# Patient Record
Sex: MALE | Race: WHITE | NOT HISPANIC OR LATINO | Employment: FULL TIME | URBAN - METROPOLITAN AREA
[De-identification: names, ages, dates, MRNs, and addresses within clinical notes are randomized per-mention and may not be internally consistent; named-entity substitution may affect disease eponyms.]

---

## 2017-01-12 ENCOUNTER — GENERIC CONVERSION - ENCOUNTER (OUTPATIENT)
Dept: OTHER | Facility: OTHER | Age: 52
End: 2017-01-12

## 2017-01-19 ENCOUNTER — ALLSCRIPTS OFFICE VISIT (OUTPATIENT)
Dept: RADIOLOGY | Facility: CLINIC | Age: 52
End: 2017-01-19
Payer: COMMERCIAL

## 2017-01-26 ENCOUNTER — GENERIC CONVERSION - ENCOUNTER (OUTPATIENT)
Dept: OTHER | Facility: OTHER | Age: 52
End: 2017-01-26

## 2017-02-17 ENCOUNTER — ALLSCRIPTS OFFICE VISIT (OUTPATIENT)
Dept: OTHER | Facility: OTHER | Age: 52
End: 2017-02-17

## 2018-01-09 NOTE — RESULT NOTES
Message   Recorded as Task   Date: 11/22/2016 08:05 AM, Created By: Toña Rivera   Task Name: Follow Up   Assigned To: Beena Haskins   Regarding Patient: Jennie Verduzco, Status: Active   Comment:    Baron Yepez - 22 Nov 2016 8:05 AM     TASK CREATED  pt referred for TFESI from neurosurgeon-kendall interested? Beena Haskins - 22 Nov 2016 11:02 AM     TASK REPLIED TO: Previously Assigned To Beena Haskins  what level? L4-s1 tfesi  dx foraminal stenosis , DDD? Baron Yepez - 22 Nov 2016 11:08 AM     TASK REPLIED TO: Previously Assigned To Baron Yepez  right L3/4    foraminal stenosis and radiculitis   Beena Haskins - 29 Nov 2016 2:01 PM     TASK EDITED  l/m for pt to rtc to schedule procedure   Beena Haskins - 29 Nov 2016 4:18 PM     TASK EDITED  pt scheduled for proce on 12/2016 at rt L3-4 TFESI, pt has not been seen in office since 2013   pt states he has a consult 11/30/2016, let me know if the procedure changes so I can do the proper authorization   Baron Yepez - 30 Nov 2016 8:03 AM     TASK REPLIED TO: Previously Assigned To Baron Yepez  aware   Beena Haskins - 30 Nov 2016 1:10 PM     TASK EDITED  per scheduling form changed procedure to Rt L3 TFESI

## 2018-01-11 NOTE — RESULT NOTES
Message   Recorded as Task   Date: 12/27/2016 09:46 AM, Created By: Colletta Bossier   Task Name: Follow Up   Assigned To: [facility] surgery sched,Team   Regarding Patient: Jalil Browne, Status: Active   Comment:    Roselia Espinal - 27 Dec 2016 9:46 AM     TASK CREATED  S/p Right L3 TFESI #2 on 12/19/16 w/Dr Erasmo Ray in [place]  No f/u scheduled    Please call 12/27/16   Roselia Espinal - 27 Dec 2016 10:24 AM     TASK EDITED  1st attempt-lm for f/u after injection   Roselia Espinal - 30 Dec 2016 3:57 PM     TASK EDITED  Pt lm on [place] procedure f/u line 12/30/16 @ 4125  -returning f/u call  -best #[phone]   Counts include 234 beds at the [facility] - 05 Jan 1443 8:44 PM     TASK EDITED  Pt reports he is doing better over all 70% relief - the numbness has gotten better but has not stopped  Any other recommendations or f/u PRN? Baron Yepez - 05 Jan 2017 1:52 PM     TASK REPLIED TO: Previously Assigned To Baron Yepez  can repeat x 1 and f/u with dg 2 weeks after   Counts include 234 beds at the [facility] - 05 Jan 3159 5:24 PM     TASK EDITED   Beena Haskins - 09 Jan 2017 3:42 PM     TASK EDITED  l/m for pt to rtc to schedule rpt proc and f/u post to [facility]  ******************   Beena Haskins - 12 Jan 2017 3:13 PM     TASK EDITED  procedure scheduled for 1/19/2017, reviewed pre-procedure instructions with pt     pt states he will schedule his 2 wk post f/u appt on 1/19/2017  Maura Rodriguez

## 2018-01-11 NOTE — MISCELLANEOUS
Message   Recorded as Task   Date: 12/06/2016 10:32 AM, Created By: Ravi Montana   Task Name: Care Coordination   Assigned To: [address] ,Team   Regarding Patient: Grzegorz Chakraborty, Status: Active   Comment:    Ravi Avitia - 06 Dec 2016 10:32 AM     TASK CREATED  please fax his Pain diary to his neurosurgeon   Faxed over Pain Diary to Dr Cheo Benedict office, Fax # [phone] as requested by Dr Wilma Perez  Active Problems    1  Disc degeneration, lumbar (722 52) (M51 36)   2  Herniated nucleus pulposus, L3-4 right (722 10) (M51 26)   3  Lumbar canal stenosis (724 02) (M48 06)   4  Lumbar radiculopathy (724 4) (M54 16)   5  Lumbar radiculopathy (724 4) (M54 16)   6  Lumbar Spondylosis With Radiculopathy (721 3)    Current Meds   1  Daily Value Multivitamin TABS; Therapy: (Recorded:04Kjy1890) to Recorded   2  HydroCHLOROthiazide 25 MG Oral Tablet; TAKE 1 TABLET DAILY; Therapy: 04NQW3889 to Recorded   3  Lisinopril 5 MG Oral Tablet; Therapy: (Recorded:19Axo4639) to Recorded   4  MetFORMIN HCl - 1000 MG Oral Tablet; TAKE 1 TABLET EVERY 12 HOURS; Therapy: 19DGR6299 to Recorded   5  Metoprolol Tartrate 100 MG Oral Tablet; TAKE 1 TABLET DAILY AS DIRECTED; Therapy: 19MNC9226 to Recorded   6  NovoLOG 100 UNIT/ML Subcutaneous Solution; Therapy: (Recorded:85Wwe0318) to Recorded   7  Sertraline HCl - 50 MG Oral Tablet; TAKE 1 TABLET DAILY; Therapy: 56TVE4819 to Recorded   8  Simvastatin 40 MG Oral Tablet; Therapy: (Recorded:10Uua7212) to Recorded   9  Tamsulosin HCl - 0 4 MG Oral Capsule; Therapy: 06TBC0471 to Recorded   10  Trulicity 4 16 RI/8 3DF Subcutaneous Solution Pen-injector; Therapy: 89MTI7673 to Recorded    Allergies    1  Morphine Derivatives   2   TETANUS    Signatures   Electronically signed by : Donald Morrison, ; Dec  8 2016  9:57AM EST                       (Author)

## 2018-01-12 NOTE — RESULT NOTES
Message   Recorded as Task   Date: 01/26/2017 09:17 AM, Created By: Malathi Jane   Task Name: Follow Up   Assigned To: [facility] procedure,Team   Regarding Patient: Sierra Jett, Status: Active   Comment:    Roselia Espinal - 26 Jan 2017 9:17 AM     TASK CREATED  S/p right L3 TFESI on 1/19/17 w/SL on 1/19/17  F/u scheduled for 2/7/17 w/DG    Please call 9/24/26   Betsy Pagan - 26 Jan 5997 1:57 PM     TASK EDITED  Pt reports over all 65% relief       He will give me time and f/u with DG on the 17th   Baron Yepez - 26 Jan 2017 2:01 PM     TASK REPLIED TO: Previously Assigned To Baron Yepez  aware        Signatures   Electronically signed by : Jose Zurita, ; Jan 26 2017  2:02PM EST                       (Author)

## 2018-01-13 VITALS
SYSTOLIC BLOOD PRESSURE: 158 MMHG | WEIGHT: 270.25 LBS | BODY MASS INDEX: 37.83 KG/M2 | HEIGHT: 71 IN | DIASTOLIC BLOOD PRESSURE: 88 MMHG | HEART RATE: 76 BPM

## 2018-04-08 ENCOUNTER — HOSPITAL ENCOUNTER (EMERGENCY)
Facility: HOSPITAL | Age: 53
Discharge: HOME | End: 2018-04-09
Attending: EMERGENCY MEDICINE | Admitting: EMERGENCY MEDICINE
Payer: COMMERCIAL

## 2018-04-08 ENCOUNTER — APPOINTMENT (EMERGENCY)
Dept: RADIOLOGY | Facility: HOSPITAL | Age: 53
End: 2018-04-08
Payer: COMMERCIAL

## 2018-04-08 VITALS
TEMPERATURE: 97.3 F | HEIGHT: 70 IN | HEART RATE: 86 BPM | OXYGEN SATURATION: 96 % | BODY MASS INDEX: 38.65 KG/M2 | RESPIRATION RATE: 18 BRPM | SYSTOLIC BLOOD PRESSURE: 120 MMHG | WEIGHT: 270 LBS | DIASTOLIC BLOOD PRESSURE: 71 MMHG

## 2018-04-08 DIAGNOSIS — R11.2 NAUSEA AND VOMITING: Primary | ICD-10-CM

## 2018-04-08 DIAGNOSIS — F10.929 ALCOHOL INTOXICATION (HCC): ICD-10-CM

## 2018-04-08 DIAGNOSIS — R19.7 DIARRHEA: ICD-10-CM

## 2018-04-08 LAB
ALBUMIN SERPL BCP-MCNC: 4 G/DL (ref 3.5–5)
ALP SERPL-CCNC: 99 U/L (ref 46–116)
ALT SERPL W P-5'-P-CCNC: 52 U/L (ref 12–78)
ANION GAP SERPL CALCULATED.3IONS-SCNC: 10 MMOL/L (ref 4–13)
AST SERPL W P-5'-P-CCNC: 36 U/L (ref 5–45)
BACTERIA UR QL AUTO: ABNORMAL /HPF
BASOPHILS # BLD AUTO: 0.03 THOUSANDS/ΜL (ref 0–0.1)
BASOPHILS NFR BLD AUTO: 0 % (ref 0–1)
BILIRUB SERPL-MCNC: 0.77 MG/DL (ref 0.2–1)
BILIRUB UR QL STRIP: NEGATIVE
BUN SERPL-MCNC: 16 MG/DL (ref 5–25)
CALCIUM SERPL-MCNC: 8.9 MG/DL (ref 8.3–10.1)
CHLORIDE SERPL-SCNC: 104 MMOL/L (ref 100–108)
CLARITY UR: CLEAR
CO2 SERPL-SCNC: 27 MMOL/L (ref 21–32)
COLOR UR: YELLOW
CREAT SERPL-MCNC: 1.12 MG/DL (ref 0.6–1.3)
EOSINOPHIL # BLD AUTO: 0.13 THOUSAND/ΜL (ref 0–0.61)
EOSINOPHIL NFR BLD AUTO: 1 % (ref 0–6)
ERYTHROCYTE [DISTWIDTH] IN BLOOD BY AUTOMATED COUNT: 13.3 % (ref 11.6–15.1)
ETHANOL SERPL-MCNC: 129 MG/DL (ref 0–3)
GFR SERPL CREATININE-BSD FRML MDRD: 75 ML/MIN/1.73SQ M
GLUCOSE SERPL-MCNC: 113 MG/DL (ref 65–140)
GLUCOSE UR STRIP-MCNC: NEGATIVE MG/DL
HCT VFR BLD AUTO: 43.4 % (ref 36.5–49.3)
HGB BLD-MCNC: 14.4 G/DL (ref 12–17)
HGB UR QL STRIP.AUTO: NEGATIVE
HYALINE CASTS #/AREA URNS LPF: ABNORMAL /LPF
KETONES UR STRIP-MCNC: NEGATIVE MG/DL
LEUKOCYTE ESTERASE UR QL STRIP: NEGATIVE
LIPASE SERPL-CCNC: 268 U/L (ref 73–393)
LYMPHOCYTES # BLD AUTO: 2.75 THOUSANDS/ΜL (ref 0.6–4.47)
LYMPHOCYTES NFR BLD AUTO: 20 % (ref 14–44)
MCH RBC QN AUTO: 30.3 PG (ref 26.8–34.3)
MCHC RBC AUTO-ENTMCNC: 33.2 G/DL (ref 31.4–37.4)
MCV RBC AUTO: 91 FL (ref 82–98)
MONOCYTES # BLD AUTO: 0.7 THOUSAND/ΜL (ref 0.17–1.22)
MONOCYTES NFR BLD AUTO: 5 % (ref 4–12)
NEUTROPHILS # BLD AUTO: 10.06 THOUSANDS/ΜL (ref 1.85–7.62)
NEUTS SEG NFR BLD AUTO: 74 % (ref 43–75)
NITRITE UR QL STRIP: NEGATIVE
NON-SQ EPI CELLS URNS QL MICRO: ABNORMAL /HPF
NRBC BLD AUTO-RTO: 0 /100 WBCS
PH UR STRIP.AUTO: 5.5 [PH] (ref 4.5–8)
PLATELET # BLD AUTO: 257 THOUSANDS/UL (ref 149–390)
PMV BLD AUTO: 11.2 FL (ref 8.9–12.7)
POTASSIUM SERPL-SCNC: 3.2 MMOL/L (ref 3.5–5.3)
PROT SERPL-MCNC: 8.2 G/DL (ref 6.4–8.2)
PROT UR STRIP-MCNC: ABNORMAL MG/DL
RBC # BLD AUTO: 4.76 MILLION/UL (ref 3.88–5.62)
RBC #/AREA URNS AUTO: ABNORMAL /HPF
SODIUM SERPL-SCNC: 141 MMOL/L (ref 136–145)
SP GR UR STRIP.AUTO: 1.02 (ref 1–1.03)
UROBILINOGEN UR QL STRIP.AUTO: 0.2 E.U./DL
WBC # BLD AUTO: 13.7 THOUSAND/UL (ref 4.31–10.16)
WBC #/AREA URNS AUTO: ABNORMAL /HPF

## 2018-04-08 PROCEDURE — 71046 X-RAY EXAM CHEST 2 VIEWS: CPT

## 2018-04-08 PROCEDURE — 96361 HYDRATE IV INFUSION ADD-ON: CPT

## 2018-04-08 PROCEDURE — 81002 URINALYSIS NONAUTO W/O SCOPE: CPT | Performed by: EMERGENCY MEDICINE

## 2018-04-08 PROCEDURE — 80053 COMPREHEN METABOLIC PANEL: CPT | Performed by: EMERGENCY MEDICINE

## 2018-04-08 PROCEDURE — 81001 URINALYSIS AUTO W/SCOPE: CPT

## 2018-04-08 PROCEDURE — 83690 ASSAY OF LIPASE: CPT | Performed by: EMERGENCY MEDICINE

## 2018-04-08 PROCEDURE — 96360 HYDRATION IV INFUSION INIT: CPT

## 2018-04-08 PROCEDURE — 80320 DRUG SCREEN QUANTALCOHOLS: CPT | Performed by: EMERGENCY MEDICINE

## 2018-04-08 PROCEDURE — 36415 COLL VENOUS BLD VENIPUNCTURE: CPT | Performed by: EMERGENCY MEDICINE

## 2018-04-08 PROCEDURE — 85025 COMPLETE CBC W/AUTO DIFF WBC: CPT | Performed by: EMERGENCY MEDICINE

## 2018-04-08 PROCEDURE — 93005 ELECTROCARDIOGRAM TRACING: CPT

## 2018-04-08 RX ORDER — LISINOPRIL 40 MG/1
40 TABLET ORAL DAILY
COMMUNITY

## 2018-04-08 RX ORDER — AMLODIPINE BESYLATE 5 MG/1
5 TABLET ORAL DAILY
COMMUNITY

## 2018-04-08 RX ORDER — MULTIVITAMIN
1 TABLET ORAL DAILY
COMMUNITY

## 2018-04-08 RX ORDER — TAMSULOSIN HYDROCHLORIDE 0.4 MG/1
0.4 CAPSULE ORAL
COMMUNITY

## 2018-04-08 RX ORDER — ATORVASTATIN CALCIUM 40 MG/1
40 TABLET, FILM COATED ORAL DAILY
COMMUNITY

## 2018-04-08 RX ORDER — POTASSIUM CHLORIDE 20 MEQ/1
40 TABLET, EXTENDED RELEASE ORAL ONCE
Status: COMPLETED | OUTPATIENT
Start: 2018-04-08 | End: 2018-04-08

## 2018-04-08 RX ORDER — HYDROCHLOROTHIAZIDE 25 MG/1
25 TABLET ORAL DAILY
COMMUNITY

## 2018-04-08 RX ORDER — GABAPENTIN 600 MG/1
600 TABLET ORAL 3 TIMES DAILY
COMMUNITY

## 2018-04-08 RX ORDER — METOPROLOL SUCCINATE 50 MG/1
25 TABLET, EXTENDED RELEASE ORAL DAILY
COMMUNITY

## 2018-04-08 RX ADMIN — SODIUM CHLORIDE 1000 ML: 0.9 INJECTION, SOLUTION INTRAVENOUS at 22:09

## 2018-04-08 RX ADMIN — POTASSIUM CHLORIDE 40 MEQ: 1500 TABLET, EXTENDED RELEASE ORAL at 23:15

## 2018-04-09 LAB
ATRIAL RATE: 86 BPM
P AXIS: 49 DEGREES
PR INTERVAL: 142 MS
QRS AXIS: -11 DEGREES
QRSD INTERVAL: 94 MS
QT INTERVAL: 342 MS
QTC INTERVAL: 404 MS
T WAVE AXIS: 75 DEGREES
VENTRICULAR RATE: 84 BPM

## 2018-04-09 PROCEDURE — 99285 EMERGENCY DEPT VISIT HI MDM: CPT

## 2018-04-09 PROCEDURE — 93010 ELECTROCARDIOGRAM REPORT: CPT | Performed by: INTERNAL MEDICINE

## 2018-04-09 NOTE — ED PROVIDER NOTES
History  Chief Complaint   Patient presents with    Altered Mental Status     Patient reported by EMS to have had altered mental status at home  Patient reports he had been drinking most of a bottle of One [employer] [place] starting around 3pm today  Patient reported to have vomited at home and prior to arrival in the ambulance     45yo male pmhx HTN, DM presents for evaluation of vomiting and diarrhea  Patient drank "about 750mL of Ganga alexandra" this afternoon and is a poor historian  Patient's daughter says patient has vomited more than 10x today and has had two episodes of diarrhea  Patient denies having similar symptoms  Denies fever, chills, chest pain, SOB, abdominal pain, hematemesis, melena, hematochezia or dysuria  Patient has an insulin pump and says sugar ranged from 130-140s today  Per EMS, patient was uncooperative and fingerstick was 107  Denies recent sick contacts or travel  Patient says he is not a daily alcohol user  Denies tobacco or recreational drug use            Prior to Admission Medications   Prescriptions Last Dose Informant Patient Reported? Taking?    Multiple Vitamin (MULTIVITAMIN) tablet   Yes Yes   Sig: Take 1 tablet by mouth daily   amLODIPine (NORVASC) 5 mg tablet   Yes Yes   Sig: Take 5 mg by mouth daily   atorvastatin (LIPITOR) 40 mg tablet   Yes Yes   Sig: Take 40 mg by mouth daily   gabapentin (NEURONTIN) 600 MG tablet   Yes Yes   Sig: Take 600 mg by mouth 3 (three) times a day   hydrochlorothiazide (HYDRODIURIL) 25 mg tablet   Yes Yes   Sig: Take 25 mg by mouth daily   insulin lispro (HumaLOG) 100 units/mL   Yes Yes   Sig: by Subcutaneous Insulin Pump route as needed   lisinopril (ZESTRIL) 40 mg tablet   Yes Yes   Sig: Take 40 mg by mouth daily   metFORMIN (GLUCOPHAGE) 1000 MG tablet   Yes Yes   Sig: Take 1,000 mg by mouth 2 (two) times a day with meals   metoprolol succinate (TOPROL-XL) 50 mg 24 hr tablet   Yes Yes   Sig: Take 25 mg by mouth daily   sertraline (ZOLOFT) 50 mg tablet Yes Yes   Sig: Take 50 mg by mouth daily   tamsulosin (FLOMAX) 0 4 mg   Yes Yes   Sig: Take 0 4 mg by mouth daily with dinner      Facility-Administered Medications: None       Past Medical History:   Diagnosis Date    Diabetes mellitus ([address] )     Hypertension        History reviewed  No pertinent surgical history  History reviewed  No pertinent family history  I have reviewed and agree with the history as documented  Social History   Substance Use Topics    Smoking status: Current Some Day Smoker    Smokeless tobacco: Never Used    Alcohol use Yes        Review of Systems   Constitutional: Negative for chills, diaphoresis, fatigue and fever  HENT: Negative for congestion, rhinorrhea and sore throat  Eyes: Negative for photophobia and visual disturbance  Respiratory: Negative for cough, chest tightness and shortness of breath  Cardiovascular: Negative for chest pain and palpitations  Gastrointestinal: Positive for diarrhea, nausea and vomiting  Negative for abdominal pain, blood in stool and constipation  Genitourinary: Negative for dysuria, frequency and hematuria  Musculoskeletal: Negative for back pain, gait problem, myalgias, neck pain and neck stiffness  Skin: Negative for pallor and rash  Neurological: Negative for weakness, light-headedness, numbness and headaches  Hematological: Negative for adenopathy  Does not bruise/bleed easily  All other systems reviewed and are negative        Physical Exam  ED Triage Vitals [04/08/18 2148]   Temperature Pulse Respirations Blood Pressure SpO2   (!) 97 3 °F (36 3 °C) 88 18 149/72 99 %      Temp Source Heart Rate Source Patient Position - Orthostatic VS BP Location FiO2 (%)   Oral Monitor Sitting Right arm --      Pain Score       No Pain           Orthostatic Vital Signs  Vitals:    04/08/18 2148 04/08/18 2215   BP: 149/72 120/71   Pulse: 88 86   Patient Position - Orthostatic VS: Sitting        Physical Exam   Constitutional: He is oriented to person, place, and time  He appears well-developed and well-nourished  No distress  Patient alert and oriented, appears intoxicated, in no acute distress    HENT:   Head: Normocephalic and atraumatic  Eyes: Conjunctivae and EOM are normal  Pupils are equal, round, and reactive to light  Neck: Normal range of motion  Neck supple  Cardiovascular: Normal rate, regular rhythm, normal heart sounds and intact distal pulses  Pulmonary/Chest: Effort normal and breath sounds normal  No respiratory distress  He has no wheezes  He has no rales  Abdominal: Soft  Bowel sounds are normal  He exhibits no distension and no mass  There is no tenderness  There is no rebound and no guarding  No hernia  Musculoskeletal: Normal range of motion  He exhibits no edema  Lymphadenopathy:     He has no cervical adenopathy  Neurological: He is alert and oriented to person, place, and time  He displays normal reflexes  No cranial nerve deficit or sensory deficit  He exhibits normal muscle tone  Coordination normal    No facial asymmetry noted, CN 2-12 intact, full ROM of upper and lower extremities, muscle strength 5/5 throughout, DTRs normal, sensation intact throughout, negative finger to nose/Faye   Skin: Skin is warm and dry  Capillary refill takes less than 2 seconds  No rash noted  He is not diaphoretic  No erythema  No pallor  Psychiatric: He has a normal mood and affect  His behavior is normal  Judgment and thought content normal    Nursing note and vitals reviewed        ED Medications  Medications    EMS REPLENISHMENT MED ( Does not apply Given to EMS 4/8/18 2207)   sodium chloride 0 9 % bolus 1,000 mL (0 mL Intravenous Stopped 4/8/18 2350)   potassium chloride (K-DUR,KLOR-CON) CR tablet 40 mEq (40 mEq Oral Given 4/8/18 2315)       Diagnostic Studies  Results Reviewed     Procedure Component Value Units Date/Time    Urine Microscopic [[id number]]  (Abnormal) Collected:  04/08/18 2303    Lab Status:  Final result Specimen:  Urine from Urine, Clean Catch Updated:  04/08/18 2341     RBC, UA None Seen /hpf      WBC, UA None Seen /hpf      Epithelial Cells None Seen /hpf      Bacteria, UA None Seen /hpf      Hyaline Casts, UA 5-10 (A) /lpf     POCT urinalysis dipstick [[id number]]  (Abnormal) Resulted:  04/08/18 2305    Lab Status:  Final result Specimen:  Urine Updated:  04/08/18 2305    ED Urine Macroscopic [[id number]]  (Abnormal) Collected:  04/08/18 2303    Lab Status:  Final result Specimen:  Urine Updated:  04/08/18 2301     Color, UA Yellow     Clarity, UA Clear     pH, UA 5 5     Leukocytes, UA Negative     Nitrite, UA Negative     Protein,  (2+) (A) mg/dl      Glucose, UA Negative mg/dl      Ketones, UA Negative mg/dl      Urobilinogen, UA 0 2 E U /dl      Bilirubin, UA Negative     Blood, UA Negative     Specific Gravity, UA 1 020    Narrative:       CLINITEK RESULT    Comprehensive metabolic panel [[id number]]  (Abnormal) Collected:  04/08/18 2208    Lab Status:  Final result Specimen:  Blood from Arm, Left Updated:  04/08/18 2239     Sodium 141 mmol/L      Potassium 3 2 (L) mmol/L      Chloride 104 mmol/L      CO2 27 mmol/L      Anion Gap 10 mmol/L      BUN 16 mg/dL      Creatinine 1 12 mg/dL      Glucose 113 mg/dL      Calcium 8 9 mg/dL      AST 36 U/L      ALT 52 U/L      Alkaline Phosphatase 99 U/L      Total Protein 8 2 g/dL      Albumin 4 0 g/dL      Total Bilirubin 0 77 mg/dL      eGFR 75 ml/min/1 73sq m     Narrative:         National Kidney Disease Education Program recommendations are as follows:  GFR calculation is accurate only with a steady state creatinine  Chronic Kidney disease less than 60 ml/min/1 73 sq  meters  Kidney failure less than 15 ml/min/1 73 sq  meters      Lipase [[id number]]  (Normal) Collected:  04/08/18 2208    Lab Status:  Final result Specimen:  Blood from Arm, Left Updated:  04/08/18 2239     Lipase 268 u/L     Ethanol [[id number]]  (Abnormal) Collected:  04/08/18 2208    Lab Status:  Final result Specimen:  Blood from Arm, Left Updated:  04/08/18 2236     Ethanol Lvl 129 (H) mg/dL     CBC and differential [[id number]]  (Abnormal) Collected:  04/08/18 2208    Lab Status:  Final result Specimen:  Blood from Arm, Left Updated:  04/08/18 2226     WBC 13 70 (H) Thousand/uL      RBC 4 76 Million/uL      Hemoglobin 14 4 g/dL      Hematocrit 43 4 %      MCV 91 fL      MCH 30 3 pg      MCHC 33 2 g/dL      RDW 13 3 %      MPV 11 2 fL      Platelets 289 Thousands/uL      nRBC 0 /100 WBCs      Neutrophils Relative 74 %      Lymphocytes Relative 20 %      Monocytes Relative 5 %      Eosinophils Relative 1 %      Basophils Relative 0 %      Neutrophils Absolute 10 06 (H) Thousands/µL      Lymphocytes Absolute 2 75 Thousands/µL      Monocytes Absolute 0 70 Thousand/µL      Eosinophils Absolute 0 13 Thousand/µL      Basophils Absolute 0 03 Thousands/µL                  XR chest 2 views   ED Interpretation by Ivonne Hernandez MD (04/08 2242)   No infiltrates or effusions noted            Procedures  Procedures      Phone Consults  ED Phone Contact    ED Course  ED Course as of Apr 09 0134   Sun Apr 08, 2018   2345 Patient discharged to care of daughter and brother in law                                MDM  Number of Diagnoses or Management Options  Alcohol intoxication ([facility] [address] ):   Diarrhea:   Nausea and vomiting:   Diagnosis management comments: Impression: 47yo male presents for evaluation of vomiting and diarrhea  Ddx: gastritis, hypo or hyperglycemia, uti  Plan: cbc, cmp, lipase, etoh, IVFs, ekg, cxr    CritCare Time    Disposition  Final diagnoses:   Nausea and vomiting   Diarrhea   Alcohol intoxication ([address] )     Time reflects when diagnosis was documented in both MDM as applicable and the Disposition within this note     Time User Action Codes Description Comment    4/8/2018 11:38 PM Gisele Klinefelter Add [R11 2] Nausea and vomiting     4/8/2018 11:39 PM Gisele Klinefelter Add [R19 7] Diarrhea     4/8/2018 11:39 PM Claude Burkitt Add [K70 517] Alcohol intoxication [facility])       ED Disposition     ED Disposition Condition Comment    Discharge  Nabeel Moody discharge to home/self care  Condition at discharge: Good        Follow-up Information     Follow up With Specialties Details Why [address], DO Family Medicine Go in 3 days As needed, If symptoms worsen [address]  [address] [facility]  [address] [address]          Discharge Medication List as of 4/8/2018 11:39 PM      CONTINUE these medications which have NOT CHANGED    Details   amLODIPine (NORVASC) 5 mg tablet Take 5 mg by mouth daily, Historical Med      atorvastatin (LIPITOR) 40 mg tablet Take 40 mg by mouth daily, Historical Med      gabapentin (NEURONTIN) 600 MG tablet Take 600 mg by mouth 3 (three) times a day, Historical Med      hydrochlorothiazide (HYDRODIURIL) 25 mg tablet Take 25 mg by mouth daily, Historical Med      insulin lispro (HumaLOG) 100 units/mL by Subcutaneous Insulin Pump route as needed, Historical Med      lisinopril (ZESTRIL) 40 mg tablet Take 40 mg by mouth daily, Historical Med      metFORMIN (GLUCOPHAGE) 1000 MG tablet Take 1,000 mg by mouth 2 (two) times a day with meals, Historical Med      metoprolol succinate (TOPROL-XL) 50 mg 24 hr tablet Take 25 mg by mouth daily, Historical Med      Multiple Vitamin (MULTIVITAMIN) tablet Take 1 tablet by mouth daily, Historical Med      sertraline (ZOLOFT) 50 mg tablet Take 50 mg by mouth daily, Historical Med      tamsulosin (FLOMAX) 0 4 mg Take 0 4 mg by mouth daily with dinner, Historical Med           No discharge procedures on file  ED Provider  Attending physically available and evaluated Nabeel Heidy MURRAY managed the patient along with the ED Attending      Electronically Signed by         Andrea Stein MD  04/09/18 2100

## 2018-04-09 NOTE — ED ATTENDING ATTESTATION
Loida Meza MD, saw and evaluated the patient  All available labs and X-rays were ordered by me or the resident and have been reviewed by myself  I discussed the patient with the resident / non-physician and agree with the resident's / non-physician practitioner's findings and plan as documented in the resident's / non-physician practicitioner's note, except where noted  At this point, I agree with the current assessment done in the ED  Chief Complaint   Patient presents with    Altered Mental Status     Patient reported by EMS to have had altered mental status at home  Patient reports he had been drinking most of a bottle of [employer] starting around 3pm today  Patient reported to have vomited at home and prior to arrival in the ambulance     This is a 46year old male presenting for N/V/D + ETOH intoxication  Daughter states that he has had 750mL of Ganga Beam    He binge drank today  Diarrhea x2, no blood  Vomiting x10+, NBNB  Never had similar  Vomiting before alcohol intake  No f/ch/cp/sob  Sugars throughout the day were normal (130-150s)  No belly pain  Denies any urinary tract infection symptoms (burning, itching, pain, blood, frequency)  Per patient's wife's brother --> there's been a lot of stress in the household that is likely triggering the patient to drink because the wife is bed ridden since November 2017? Patient also tells me the nausea he had was only after he started to drink heavily, which isn't unusual for him  PMH:  - DM with insulin pump  - HTN  - chronic back pain  PSH:  - ?  Smokes daily  Alcohol socially  No drugs  PE:  Vitals:    04/08/18 2148 04/08/18 2215   BP: 149/72 120/71   BP Location: Right arm    Pulse: 88 86   Resp: 18    Temp: (!) 97 3 °F (36 3 °C)    TempSrc: Oral    SpO2: 99% 96%   Weight: 122 kg (270 lb)    Height: 5' 10" (1 778 m)    General: VSS, NAD, awake, alert  Well-nourished, well-developed  Appears stated age     Speaking normally in full sentences  Head: Normocephalic, atraumatic, nontender  Eyes: PERRL, EOM-I  No diplopia  No hyphema  No subconjunctival hemorrhages  Symmetrical lids  ENT: Atraumatic external nose and ears  MMM  No malocclusion  No stridor  Normal phonation  No drooling  Normal swallowing  Neck: Symmetric, trachea midline  No JVD  CV: RRR  +S1/S2  No murmurs or gallops  Peripheral pulses +2 throughout  No chest wall tenderness  Lungs:   Unlabored No retractions  CTAB, lungs sounds equal bilateral    No tachypnea  Abd: +BS, soft, NT/ND    MSK:   FROM   Back:   No rashes  Skin: Dry, intact  Neuro: AAOx3, GCS 15, CN II-XII grossly intact  Smells of alcohol but clinically sober  Motor grossly intact  Psychiatric/Behavioral: Appropriate mood and affect   Exam: deferred  A:  - ETOH intoxication  - NVD  P:  - Labs (glucose 107 prehospital)  - fluids  - urine  - cxr for aspiration  - Likely DC home   - benign abdominal examination --> CT of low yield at this point  - 13 point ROS was performed and all are normal unless stated in the history above  - Nursing note reviewed  Vitals reviewed  - Orders placed by myself and/or advanced practitioner / resident     - Previous chart was reviewed  - No language barrier    - History obtained from daughter patient  - There are no limitations to the history obtained  - Critical care time: Not applicable for this patient  Final Diagnosis:  1  Nausea and vomiting    2  Diarrhea    3   Alcohol intoxication [facility])      ED Course as of Apr 12 1604   Sun Apr 08, 2018   2243 MEDICAL ALCOHOL: (!) 129   2243 Potassium: (!) 3 2     Medications    EMS REPLENISHMENT MED ( Does not apply Given to EMS 4/8/18 2207)   sodium chloride 0 9 % bolus 1,000 mL (0 mL Intravenous Stopped 4/8/18 2350)   potassium chloride (K-DUR,KLOR-CON) CR tablet 40 mEq (40 mEq Oral Given 4/8/18 2315)     XR chest 2 views   ED Interpretation   No infiltrates or effusions noted      Final Result      No acute cardiopulmonary disease  Workstation performed: [id number]           Orders Placed This Encounter   Procedures    XR chest 2 views    CBC and differential    Comprehensive metabolic panel    Lipase    Ethanol    Urine Microscopic    Continuous cardiac monitoring    EKG RESULTS    POCT urinalysis dipstick    ECG 12 lead    ECG 12 lead     Labs Reviewed   CBC AND DIFFERENTIAL - Abnormal        Result Value Ref Range Status    WBC 13 70 (*) 4 31 - 10 16 Thousand/uL Final    RBC 4 76  3 88 - 5 62 Million/uL Final    Hemoglobin 14 4  12 0 - 17 0 g/dL Final    Hematocrit 43 4  36 5 - 49 3 % Final    MCV 91  82 - 98 fL Final    MCH 30 3  26 8 - 34 3 pg Final    MCHC 33 2  31 4 - 37 4 g/dL Final    RDW 13 3  11 6 - 15 1 % Final    MPV 11 2  8 9 - 12 7 fL Final    Platelets 414  867 - 390 Thousands/uL Final    nRBC 0  /100 WBCs Final    Neutrophils Relative 74  43 - 75 % Final    Lymphocytes Relative 20  14 - 44 % Final    Monocytes Relative 5  4 - 12 % Final    Eosinophils Relative 1  0 - 6 % Final    Basophils Relative 0  0 - 1 % Final    Neutrophils Absolute 10 06 (*) 1 85 - 7 62 Thousands/µL Final    Lymphocytes Absolute 2 75  0 60 - 4 47 Thousands/µL Final    Monocytes Absolute 0 70  0 17 - 1 22 Thousand/µL Final    Eosinophils Absolute 0 13  0 00 - 0 61 Thousand/µL Final    Basophils Absolute 0 03  0 00 - 0 10 Thousands/µL Final   COMPREHENSIVE METABOLIC PANEL - Abnormal     Sodium 141  136 - 145 mmol/L Final    Potassium 3 2 (*) 3 5 - 5 3 mmol/L Final    Chloride 104  100 - 108 mmol/L Final    CO2 27  21 - 32 mmol/L Final    Anion Gap 10  4 - 13 mmol/L Final    BUN 16  5 - 25 mg/dL Final    Creatinine 1 12  0 60 - 1 30 mg/dL Final    Comment: Standardized to IDMS reference method    Glucose 113  65 - 140 mg/dL Final    Comment:   If the patient is fasting, the ADA then defines impaired fasting glucose as > 100 mg/dL and diabetes as > or equal to 123 mg/dL    Specimen collection should occur prior to Sulfasalazine administration due to the potential for falsely depressed results  Specimen collection should occur prior to Sulfapyridine administration due to the potential for falsely elevated results  Calcium 8 9  8 3 - 10 1 mg/dL Final    AST 36  5 - 45 U/L Final    Comment:   Specimen collection should occur prior to Sulfasalazine administration due to the potential for falsely depressed results  ALT 52  12 - 78 U/L Final    Comment:   Specimen collection should occur prior to Sulfasalazine and/or Sulfapyridine administration due to the potential for falsely depressed results  Alkaline Phosphatase 99  46 - 116 U/L Final    Total Protein 8 2  6 4 - 8 2 g/dL Final    Albumin 4 0  3 5 - 5 0 g/dL Final    Total Bilirubin 0 77  0 20 - 1 00 mg/dL Final    eGFR 75  ml/min/1 73sq m Final    Narrative:     National Kidney Disease Education Program recommendations are as follows:  GFR calculation is accurate only with a steady state creatinine  Chronic Kidney disease less than 60 ml/min/1 73 sq  meters  Kidney failure less than 15 ml/min/1 73 sq  meters     MEDICAL ALCOHOL - Abnormal     Ethanol Lvl 129 (*) 0 - 3 mg/dL Final   URINE MICROSCOPIC - Abnormal     RBC, UA None Seen  None Seen, 0-5 /hpf Final    WBC, UA None Seen  None Seen, 0-5, 5-55, 5-65 /hpf Final    Epithelial Cells None Seen  None Seen, Occasional /hpf Final    Bacteria, UA None Seen  None Seen, Occasional /hpf Final    Hyaline Casts, UA 5-10 (*) None Seen /lpf Final   POCT URINALYSIS DIPSTICK - Abnormal    ED URINE MACROSCOPIC - Abnormal     Color, UA Yellow   Final    Clarity, UA Clear   Final    pH, UA 5 5  4 5 - 8 0 Final    Leukocytes, UA Negative  Negative Final    Nitrite, UA Negative  Negative Final    Protein,  (2+) (*) Negative mg/dl Final    Glucose, UA Negative  Negative mg/dl Final    Ketones, UA Negative  Negative mg/dl Final    Urobilinogen, UA 0 2  0 2, 1 0 E U /dl E U /dl Final    Bilirubin, UA Negative  Negative Final    Blood, UA Negative  Negative Final    Specific Gravity, UA 1 020  1 003 - 1 030 Final    Narrative:     CLINITEK RESULT   LIPASE - Normal    Lipase 268  73 - 393 u/L Final     Time reflects when diagnosis was documented in both MDM as applicable and the Disposition within this note     Time User Action Codes Description Comment    4/8/2018 11:38 PM Kayleigh Flatter Add [R11 2] Nausea and vomiting     4/8/2018 11:39 PM Burnice Shook [R19 7] Diarrhea     4/8/2018 11:39 PM Kayleigh Flatter Add [F10 929] Alcohol intoxication [facility])       ED Disposition     ED Disposition Condition Comment    Discharge  Lázaro Roche discharge to home/self care      Condition at discharge: Good        Follow-up Information     Follow up With Specialties Details Why [address], DO Family Medicine Go in 3 days As needed, If symptoms worsen [address] [facility]  [address] [facility]  [address] [address] [facility]          Discharge Medication List as of 4/8/2018 11:39 PM      CONTINUE these medications which have NOT CHANGED    Details   amLODIPine (NORVASC) 5 mg tablet Take 5 mg by mouth daily, Historical Med      atorvastatin (LIPITOR) 40 mg tablet Take 40 mg by mouth daily, Historical Med      gabapentin (NEURONTIN) 600 MG tablet Take 600 mg by mouth 3 (three) times a day, Historical Med      hydrochlorothiazide (HYDRODIURIL) 25 mg tablet Take 25 mg by mouth daily, Historical Med      insulin lispro (HumaLOG) 100 units/mL by Subcutaneous Insulin Pump route as needed, Historical Med      lisinopril (ZESTRIL) 40 mg tablet Take 40 mg by mouth daily, Historical Med      metFORMIN (GLUCOPHAGE) 1000 MG tablet Take 1,000 mg by mouth 2 (two) times a day with meals, Historical Med      metoprolol succinate (TOPROL-XL) 50 mg 24 hr tablet Take 25 mg by mouth daily, Historical Med      Multiple Vitamin (MULTIVITAMIN) tablet Take 1 tablet by mouth daily, Historical Med      sertraline (ZOLOFT) 50 mg tablet Take 50 mg by mouth daily, Historical Med      tamsulosin (FLOMAX) 0 4 mg Take 0 4 mg by mouth daily with dinner, Historical Med           No discharge procedures on file  Prior to Admission Medications   Prescriptions Last Dose Informant Patient Reported? Taking? Multiple Vitamin (MULTIVITAMIN) tablet   Yes Yes   Sig: Take 1 tablet by mouth daily   amLODIPine (NORVASC) 5 mg tablet   Yes Yes   Sig: Take 5 mg by mouth daily   atorvastatin (LIPITOR) 40 mg tablet   Yes Yes   Sig: Take 40 mg by mouth daily   gabapentin (NEURONTIN) 600 MG tablet   Yes Yes   Sig: Take 600 mg by mouth 3 (three) times a day   hydrochlorothiazide (HYDRODIURIL) 25 mg tablet   Yes Yes   Sig: Take 25 mg by mouth daily   insulin lispro (HumaLOG) 100 units/mL   Yes Yes   Sig: by Subcutaneous Insulin Pump route as needed   lisinopril (ZESTRIL) 40 mg tablet   Yes Yes   Sig: Take 40 mg by mouth daily   metFORMIN (GLUCOPHAGE) 1000 MG tablet   Yes Yes   Sig: Take 1,000 mg by mouth 2 (two) times a day with meals   metoprolol succinate (TOPROL-XL) 50 mg 24 hr tablet   Yes Yes   Sig: Take 25 mg by mouth daily   sertraline (ZOLOFT) 50 mg tablet   Yes Yes   Sig: Take 50 mg by mouth daily   tamsulosin (FLOMAX) 0 4 mg   Yes Yes   Sig: Take 0 4 mg by mouth daily with dinner      Facility-Administered Medications: None       Portions of the record may have been created with voice recognition software  Occasional wrong word or "sound a like" substitutions may have occurred due to the inherent limitations of voice recognition software  Read the chart carefully and recognize, using context, where substitutions have occurred      Electronically signed by:  Moises Santizo

## 2018-04-09 NOTE — DISCHARGE INSTRUCTIONS
Acute Diarrhea   WHAT YOU NEED TO KNOW:   Acute diarrhea starts quickly and lasts a short time, usually 1 to 3 days  It can last up to 2 weeks  You may not be able to control your diarrhea  Acute diarrhea usually stops on its own  DISCHARGE INSTRUCTIONS:   Return to the emergency department if:   · You feel confused  · Your heartbeat is faster than normal      · Your eyes look deeply sunken, or you have no tears when you cry  · You urinate less than usual, or your urine is dark yellow  · You have blood or mucus in your stools  · You have severe abdominal pain  · You are unable to drink any liquids  Contact your healthcare provider if:   · Your symptoms do not get better with treatment  · You have a fever higher than 101 3°F (38 5°C)  · You have trouble eating and drinking because you are vomiting  · You are thirsty or have a dry mouth  · Your diarrhea does not get better in 7 days  · You have questions or concerns about your condition or care  Follow up with your healthcare provider as directed:  Write down your questions so you remember to ask them during your visits  Medicines:  · Diarrhea medicine  is an over-the-counter medicine that helps slow or stop your diarrhea  If you take other medicines, talk to your healthcare provider before you take diarrhea medicine  · Antibiotics  may be given to help treat an infection caused by bacteria  · Antiparasitics  may be given to treat an infection caused by parasites  · Take your medicine as directed  Contact your healthcare provider if you think your medicine is not helping or if you have side effects  Tell him of her if you are allergic to any medicine  Keep a list of the medicines, vitamins, and herbs you take  Include the amounts, and when and why you take them  Bring the list or the pill bottles to follow-up visits  Carry your medicine list with you in case of an emergency    Self-care:   · Drink liquids as directed  Liquids will help prevent dehydration caused by diarrhea  Ask your healthcare provider how much liquid to drink each day and which liquids are best for you  You may need to drink an oral rehydration solution (ORS)  An ORS has the right amounts of water, salts, and sugar you need to replace body fluids  You can buy an ORS at most grocery stores and pharmacies  · Eat foods that are easy to digest   Examples include rice, lentils, cereal, bananas, potatoes, and bread  It also includes some fruits (bananas, melon), well-cooked vegetables, and lean meats  Avoid foods high in fiber, fat, and sugar  Also avoid caffeine, alcohol, dairy, and red meat until your diarrhea is gone  Prevent acute diarrhea:   · Wash your hands often  Use soap and water  Wash your hands before you eat or prepare food  Also wash your hands after you use the bathroom  Use an alcohol-based hand gel when soap and water are not available  · Keep bathroom surfaces clean  This helps prevent the spread of germs that cause acute diarrhea  · Wash fruits and vegetables well before you eat them  This can help remove germs that cause diarrhea  If possible, remove the skin from fruits and vegetables, or cook them well before you eat them  · Cook meat as directed  ¨ Cook ground meat  to 160°F      ¨ Cook ground poultry, whole poultry, or cuts of poultry  to at least 165°F  Remove the meat from heat  Let it stand for 3 minutes before you eat it  ¨ Cook whole cuts of meat other than poultry  to at least 145°F  Remove the meat from heat  Let it stand for 3 minutes before you eat it  · Wash dishes that have touched raw meat with hot water and soap  This includes cutting boards, utensils, dishes, and serving containers  · Place raw or cooked meat in the refrigerator as soon as possible  Bacteria can grow in meat that is left at room temperature too long  · Do not eat raw or undercooked oysters, clams, or mussels  These foods may be contaminated and cause infection  · Drink filtered or treated water only when you travel  Do not put ice in your drinks  Drink bottled water whenever possible  © 2017 [address] [employer]  Information is for End User's use only and may not be sold, redistributed or otherwise used for commercial purposes  All illustrations and images included in CareNotes® are the copyrighted property of A D A M , Inc  or Freddy Nguyen  The above information is an  only  It is not intended as medical advice for individual conditions or treatments  Talk to your doctor, nurse or pharmacist before following any medical regimen to see if it is safe and effective for you  Acute Nausea and Vomiting   WHAT YOU NEED TO KNOW:   Acute nausea and vomiting start suddenly, worsen quickly, and last a short time  DISCHARGE INSTRUCTIONS:   Return to the emergency department if:   · You see blood in your vomit or your bowel movements  · You have sudden, severe pain in your chest and upper abdomen after hard vomiting or retching  · You have swelling in your neck and chest      · You are dizzy, cold, and thirsty and your eyes and mouth are dry  · You are urinating very little or not at all  · You have muscle weakness, leg cramps, and trouble breathing  · Your heart is beating much faster than normal      · You continue to vomit for more than 48 hours  Contact your healthcare provider if:   · You have frequent dry heaves (vomiting but nothing comes out)  · Your nausea and vomiting does not get better or go away after you use medicine  · You have questions or concerns about your condition or treatment  Medicines: You may need any of the following:  · Medicines  may be given to calm your stomach and stop your vomiting  You may also need medicines to help you feel more relaxed or to stop nausea and vomiting caused by motion sickness      · Gastrointestinal stimulants  are used to help empty your stomach and bowels  This may help decrease nausea and vomiting  · Take your medicine as directed  Contact your healthcare provider if you think your medicine is not helping or if you have side effects  Tell him or her if you are allergic to any medicine  Keep a list of the medicines, vitamins, and herbs you take  Include the amounts, and when and why you take them  Bring the list or the pill bottles to follow-up visits  Carry your medicine list with you in case of an emergency  Prevent or manage acute nausea and vomiting:   · Do not drink alcohol  Alcohol may upset or irritate your stomach  Too much alcohol can also cause acute nausea and vomiting  · Control stress  Headaches due to stress may cause nausea and vomiting  Find ways to relax and manage your stress  Get more rest and sleep  · Drink more liquids as directed  Vomiting can lead to dehydration  It is important to drink more liquids to help replace lost body fluids  Ask your healthcare provider how much liquid to drink each day and which liquids are best for you  Your provider may recommend that you drink an oral rehydration solution (ORS)  ORS contains water, salts, and sugar that are needed to replace the lost body fluids  Ask what kind of ORS to use, how much to drink, and where to get it  · Eat smaller meals, more often  Eat small amounts of food every 2 to 3 hours, even if you are not hungry  Food in your stomach may decrease your nausea  · Talk to your healthcare provider before you take over-the-counter (OTC) medicines  These medicines can cause serious problems if you use certain other medicines, or you have a medical condition  You may have problems if you use too much or use them for longer than the label says  Follow directions on the label carefully  Follow up with your healthcare provider as directed:  Write down your questions so you remember to ask them during your follow-up visits    © 2017 [employer] [address] is for End User's use only and may not be sold, redistributed or otherwise used for commercial purposes  All illustrations and images included in CareNotes® are the copyrighted property of [employer] , [facility]  or Freddy Nguyen  The above information is an  only  It is not intended as medical advice for individual conditions or treatments  Talk to your doctor, nurse or pharmacist before following any medical regimen to see if it is safe and effective for you  Alcohol Intoxication   WHAT YOU NEED TO KNOW:   Alcohol intoxication is a harmful physical condition caused when you drink more alcohol than your body can handle  It is also called ethanol poisoning, or being drunk  DISCHARGE INSTRUCTIONS:   Medicine: You may be given medicine to manage the signs and symptoms of alcohol intoxication  Take your medicine as directed  Contact your healthcare provider if you think your medicine is not helping or if you have side effects  Tell him if you are allergic to any medicine  Keep a list of the medicines, vitamins, and herbs you take  Include the amounts, and when and why you take them  Bring the list or the pill bottles to follow-up visits  Keep the list with you in case of emergency  Follow up with your healthcare provider as directed:  Write down your questions so you remember to ask them during your visits  Limit or avoid alcohol:  Men should not have more than 2 drinks per day  Women should not have more than 1 drink per day  A drink is 12 ounces of beer, 5 ounces of wine, or 1½ ounces of liquor  Do not drive or operate machines when you drink alcohol:  Make sure you always have someone to drive you when you drink alcohol  Learn ways to manage stress  Deep breathing, meditation, and listening to music may help you cope with stressful events  Talk to your caregiver about other ways to manage stress     For more information:   · Alcoholics Anonymous  Web Address: http://www vines info/  Contact your healthcare provider if:   · You need help to stop drinking alcohol  · You have trouble with work or school because you drink too much alcohol  · You have physical or verbal fights because of alcohol  · You have questions or concerns about your condition or care  Seek care immediately or call 911 if:   · You have sudden trouble breathing or chest pain  · You have a seizure  · You feel sad enough to harm yourself or others  · You have hallucinations (you see or hear things that are not real)  · You cannot stop vomiting  · You were in an accident because of alcohol  © 2017 [address] [facility] Information is for End User's use only and may not be sold, redistributed or otherwise used for commercial purposes  All illustrations and images included in CareNotes® are the copyrighted property of A D A M , Inc  or Freddy Nguyen  The above information is an  only  It is not intended as medical advice for individual conditions or treatments  Talk to your doctor, nurse or pharmacist before following any medical regimen to see if it is safe and effective for you

## 2020-05-08 ENCOUNTER — HOSPITAL ENCOUNTER (EMERGENCY)
Facility: HOSPITAL | Age: 55
Discharge: HOME | End: 2020-05-08
Attending: EMERGENCY MEDICINE | Admitting: EMERGENCY MEDICINE
Payer: COMMERCIAL

## 2020-05-08 VITALS
OXYGEN SATURATION: 97 % | SYSTOLIC BLOOD PRESSURE: 159 MMHG | HEART RATE: 89 BPM | TEMPERATURE: 97 F | WEIGHT: 270 LBS | DIASTOLIC BLOOD PRESSURE: 86 MMHG | BODY MASS INDEX: 38.65 KG/M2 | HEIGHT: 70 IN | RESPIRATION RATE: 18 BRPM

## 2020-05-08 DIAGNOSIS — R45.851 SUICIDAL IDEATION: Primary | ICD-10-CM

## 2020-05-08 DIAGNOSIS — Z79.4 INSULIN DEPENDENT TYPE 2 DIABETES MELLITUS, CONTROLLED (HCC): ICD-10-CM

## 2020-05-08 DIAGNOSIS — E11.9 INSULIN DEPENDENT TYPE 2 DIABETES MELLITUS, CONTROLLED (HCC): ICD-10-CM

## 2020-05-08 LAB
AMPHETAMINES SERPL QL SCN: NEGATIVE
ANION GAP SERPL CALCULATED.3IONS-SCNC: 7 MMOL/L (ref 4–13)
ATRIAL RATE: 92 BPM
BARBITURATES UR QL: NEGATIVE
BASOPHILS # BLD AUTO: 0.05 THOUSANDS/ΜL (ref 0–0.1)
BASOPHILS NFR BLD AUTO: 1 % (ref 0–1)
BENZODIAZ UR QL: NEGATIVE
BUN SERPL-MCNC: 12 MG/DL (ref 5–25)
CALCIUM SERPL-MCNC: 9.2 MG/DL (ref 8.3–10.1)
CHLORIDE SERPL-SCNC: 98 MMOL/L (ref 100–108)
CO2 SERPL-SCNC: 32 MMOL/L (ref 21–32)
COCAINE UR QL: NEGATIVE
CREAT SERPL-MCNC: 0.76 MG/DL (ref 0.6–1.3)
EOSINOPHIL # BLD AUTO: 0.28 THOUSAND/ΜL (ref 0–0.61)
EOSINOPHIL NFR BLD AUTO: 5 % (ref 0–6)
ERYTHROCYTE [DISTWIDTH] IN BLOOD BY AUTOMATED COUNT: 14.2 % (ref 11.6–15.1)
ETHANOL EXG-MCNC: 0 MG/DL
GFR SERPL CREATININE-BSD FRML MDRD: 103 ML/MIN/1.73SQ M
GLUCOSE SERPL-MCNC: 164 MG/DL (ref 65–140)
HCT VFR BLD AUTO: 37.3 % (ref 36.5–49.3)
HGB BLD-MCNC: 12 G/DL (ref 12–17)
IMM GRANULOCYTES # BLD AUTO: 0.01 THOUSAND/UL (ref 0–0.2)
IMM GRANULOCYTES NFR BLD AUTO: 0 % (ref 0–2)
LYMPHOCYTES # BLD AUTO: 2.21 THOUSANDS/ΜL (ref 0.6–4.47)
LYMPHOCYTES NFR BLD AUTO: 39 % (ref 14–44)
MCH RBC QN AUTO: 26.7 PG (ref 26.8–34.3)
MCHC RBC AUTO-ENTMCNC: 32.2 G/DL (ref 31.4–37.4)
MCV RBC AUTO: 83 FL (ref 82–98)
METHADONE UR QL: NEGATIVE
MONOCYTES # BLD AUTO: 0.6 THOUSAND/ΜL (ref 0.17–1.22)
MONOCYTES NFR BLD AUTO: 11 % (ref 4–12)
NEUTROPHILS # BLD AUTO: 2.48 THOUSANDS/ΜL (ref 1.85–7.62)
NEUTS SEG NFR BLD AUTO: 44 % (ref 43–75)
NRBC BLD AUTO-RTO: 0 /100 WBCS
OPIATES UR QL SCN: NEGATIVE
P AXIS: 41 DEGREES
PCP UR QL: NEGATIVE
PLATELET # BLD AUTO: 322 THOUSANDS/UL (ref 149–390)
PMV BLD AUTO: 11.3 FL (ref 8.9–12.7)
POTASSIUM SERPL-SCNC: 3.5 MMOL/L (ref 3.5–5.3)
PR INTERVAL: 146 MS
QRS AXIS: -14 DEGREES
QRSD INTERVAL: 86 MS
QT INTERVAL: 358 MS
QTC INTERVAL: 442 MS
RBC # BLD AUTO: 4.5 MILLION/UL (ref 3.88–5.62)
SARS-COV-2 RNA RESP QL NAA+PROBE: NEGATIVE
SODIUM SERPL-SCNC: 137 MMOL/L (ref 136–145)
T WAVE AXIS: 33 DEGREES
THC UR QL: NEGATIVE
TSH SERPL DL<=0.05 MIU/L-ACNC: 1.65 UIU/ML (ref 0.36–3.74)
VENTRICULAR RATE: 92 BPM
WBC # BLD AUTO: 5.63 THOUSAND/UL (ref 4.31–10.16)

## 2020-05-08 PROCEDURE — 87635 SARS-COV-2 COVID-19 AMP PRB: CPT | Performed by: EMERGENCY MEDICINE

## 2020-05-08 PROCEDURE — 99285 EMERGENCY DEPT VISIT HI MDM: CPT

## 2020-05-08 PROCEDURE — 84443 ASSAY THYROID STIM HORMONE: CPT

## 2020-05-08 PROCEDURE — 93010 ELECTROCARDIOGRAM REPORT: CPT | Performed by: INTERNAL MEDICINE

## 2020-05-08 PROCEDURE — 82075 ASSAY OF BREATH ETHANOL: CPT

## 2020-05-08 PROCEDURE — 99283 EMERGENCY DEPT VISIT LOW MDM: CPT | Performed by: EMERGENCY MEDICINE

## 2020-05-08 PROCEDURE — 80048 BASIC METABOLIC PNL TOTAL CA: CPT | Performed by: EMERGENCY MEDICINE

## 2020-05-08 PROCEDURE — 36415 COLL VENOUS BLD VENIPUNCTURE: CPT

## 2020-05-08 PROCEDURE — 80307 DRUG TEST PRSMV CHEM ANLYZR: CPT

## 2020-05-08 PROCEDURE — 93005 ELECTROCARDIOGRAM TRACING: CPT

## 2020-05-08 PROCEDURE — 85025 COMPLETE CBC W/AUTO DIFF WBC: CPT | Performed by: EMERGENCY MEDICINE

## 2020-05-13 ENCOUNTER — TELEMEDICINE (OUTPATIENT)
Dept: PSYCHIATRY | Facility: CLINIC | Age: 55
End: 2020-05-13
Payer: COMMERCIAL

## 2020-05-13 ENCOUNTER — OFFICE VISIT (OUTPATIENT)
Dept: PSYCHOLOGY | Facility: CLINIC | Age: 55
End: 2020-05-13
Payer: COMMERCIAL

## 2020-05-13 DIAGNOSIS — F33.2 MAJOR DEPRESSIVE DISORDER, RECURRENT SEVERE WITHOUT PSYCHOTIC FEATURES (HCC): Primary | ICD-10-CM

## 2020-05-13 PROBLEM — E66.9 OBESITY: Status: ACTIVE | Noted: 2019-11-12

## 2020-05-13 PROBLEM — G89.29 CHRONIC RIGHT-SIDED LOW BACK PAIN WITH RIGHT-SIDED SCIATICA: Status: ACTIVE | Noted: 2017-02-17

## 2020-05-13 PROBLEM — M54.41 CHRONIC RIGHT-SIDED LOW BACK PAIN WITH RIGHT-SIDED SCIATICA: Status: ACTIVE | Noted: 2017-02-17

## 2020-05-13 PROCEDURE — S0201 PARTIAL HOSPITALIZATION SERV: HCPCS

## 2020-05-13 PROCEDURE — 99204 OFFICE O/P NEW MOD 45 MIN: CPT | Performed by: PSYCHIATRY & NEUROLOGY

## 2020-05-13 PROCEDURE — 90791 PSYCH DIAGNOSTIC EVALUATION: CPT

## 2020-05-13 RX ORDER — DULOXETIN HYDROCHLORIDE 60 MG/1
60 CAPSULE, DELAYED RELEASE ORAL DAILY
COMMUNITY
End: 2021-10-14 | Stop reason: SDUPTHER

## 2020-05-13 RX ORDER — BUPROPION HYDROCHLORIDE 150 MG/1
150 TABLET ORAL DAILY
Qty: 30 TABLET | Refills: 1 | Status: SHIPPED | OUTPATIENT
Start: 2020-05-13 | End: 2021-01-12 | Stop reason: SDUPTHER

## 2020-05-14 ENCOUNTER — OFFICE VISIT (OUTPATIENT)
Dept: PSYCHOLOGY | Facility: CLINIC | Age: 55
End: 2020-05-14
Payer: COMMERCIAL

## 2020-05-14 DIAGNOSIS — F33.2 MAJOR DEPRESSIVE DISORDER, RECURRENT SEVERE WITHOUT PSYCHOTIC FEATURES (HCC): Primary | ICD-10-CM

## 2020-05-14 PROCEDURE — G0177 OPPS/PHP; TRAIN & EDUC SERV: HCPCS

## 2020-05-14 PROCEDURE — S0201 PARTIAL HOSPITALIZATION SERV: HCPCS

## 2020-05-14 PROCEDURE — G0176 OPPS/PHP;ACTIVITY THERAPY: HCPCS

## 2020-05-14 PROCEDURE — NC001 PR NO CHARGE: Performed by: PSYCHIATRY & NEUROLOGY

## 2020-05-14 PROCEDURE — G0410 GRP PSYCH PARTIAL HOSP 45-50: HCPCS

## 2020-05-14 PROCEDURE — 90832 PSYTX W PT 30 MINUTES: CPT

## 2020-05-15 ENCOUNTER — OFFICE VISIT (OUTPATIENT)
Dept: PSYCHOLOGY | Facility: CLINIC | Age: 55
End: 2020-05-15
Payer: COMMERCIAL

## 2020-05-15 DIAGNOSIS — F33.2 MAJOR DEPRESSIVE DISORDER, RECURRENT SEVERE WITHOUT PSYCHOTIC FEATURES (HCC): Primary | ICD-10-CM

## 2020-05-15 PROCEDURE — S0201 PARTIAL HOSPITALIZATION SERV: HCPCS

## 2020-05-15 PROCEDURE — G0410 GRP PSYCH PARTIAL HOSP 45-50: HCPCS

## 2020-05-15 PROCEDURE — G0177 OPPS/PHP; TRAIN & EDUC SERV: HCPCS

## 2020-05-15 PROCEDURE — G0176 OPPS/PHP;ACTIVITY THERAPY: HCPCS

## 2020-05-15 PROCEDURE — NC001 PR NO CHARGE: Performed by: PSYCHIATRY & NEUROLOGY

## 2020-05-18 ENCOUNTER — OFFICE VISIT (OUTPATIENT)
Dept: PSYCHOLOGY | Facility: CLINIC | Age: 55
End: 2020-05-18
Payer: COMMERCIAL

## 2020-05-18 DIAGNOSIS — F33.2 MAJOR DEPRESSIVE DISORDER, RECURRENT SEVERE WITHOUT PSYCHOTIC FEATURES (HCC): Primary | ICD-10-CM

## 2020-05-18 PROCEDURE — NC001 PR NO CHARGE: Performed by: STUDENT IN AN ORGANIZED HEALTH CARE EDUCATION/TRAINING PROGRAM

## 2020-05-18 PROCEDURE — S0201 PARTIAL HOSPITALIZATION SERV: HCPCS

## 2020-05-18 PROCEDURE — 90834 PSYTX W PT 45 MINUTES: CPT

## 2020-05-18 PROCEDURE — G0177 OPPS/PHP; TRAIN & EDUC SERV: HCPCS

## 2020-05-18 PROCEDURE — G0176 OPPS/PHP;ACTIVITY THERAPY: HCPCS

## 2020-05-18 PROCEDURE — G0410 GRP PSYCH PARTIAL HOSP 45-50: HCPCS

## 2020-05-19 ENCOUNTER — OFFICE VISIT (OUTPATIENT)
Dept: PSYCHOLOGY | Facility: CLINIC | Age: 55
End: 2020-05-19
Payer: COMMERCIAL

## 2020-05-19 DIAGNOSIS — F33.2 MAJOR DEPRESSIVE DISORDER, RECURRENT SEVERE WITHOUT PSYCHOTIC FEATURES (HCC): Primary | ICD-10-CM

## 2020-05-19 PROCEDURE — S0201 PARTIAL HOSPITALIZATION SERV: HCPCS

## 2020-05-19 PROCEDURE — G0410 GRP PSYCH PARTIAL HOSP 45-50: HCPCS

## 2020-05-19 PROCEDURE — NC001 PR NO CHARGE: Performed by: PSYCHIATRY & NEUROLOGY

## 2020-05-19 PROCEDURE — G0177 OPPS/PHP; TRAIN & EDUC SERV: HCPCS

## 2020-05-20 ENCOUNTER — TELEMEDICINE (OUTPATIENT)
Dept: PSYCHIATRY | Facility: CLINIC | Age: 55
End: 2020-05-20
Payer: COMMERCIAL

## 2020-05-20 ENCOUNTER — OFFICE VISIT (OUTPATIENT)
Dept: PSYCHOLOGY | Facility: CLINIC | Age: 55
End: 2020-05-20
Payer: COMMERCIAL

## 2020-05-20 DIAGNOSIS — F33.2 MAJOR DEPRESSIVE DISORDER, RECURRENT SEVERE WITHOUT PSYCHOTIC FEATURES (HCC): Primary | ICD-10-CM

## 2020-05-20 PROCEDURE — 99213 OFFICE O/P EST LOW 20 MIN: CPT | Performed by: NURSE PRACTITIONER

## 2020-05-20 PROCEDURE — S0201 PARTIAL HOSPITALIZATION SERV: HCPCS

## 2020-05-20 PROCEDURE — 90834 PSYTX W PT 45 MINUTES: CPT

## 2020-05-20 PROCEDURE — G0176 OPPS/PHP;ACTIVITY THERAPY: HCPCS

## 2020-05-20 PROCEDURE — G0410 GRP PSYCH PARTIAL HOSP 45-50: HCPCS

## 2020-05-20 PROCEDURE — G0177 OPPS/PHP; TRAIN & EDUC SERV: HCPCS

## 2020-05-21 ENCOUNTER — OFFICE VISIT (OUTPATIENT)
Dept: PSYCHOLOGY | Facility: CLINIC | Age: 55
End: 2020-05-21
Payer: COMMERCIAL

## 2020-05-21 DIAGNOSIS — F33.2 MAJOR DEPRESSIVE DISORDER, RECURRENT SEVERE WITHOUT PSYCHOTIC FEATURES (HCC): Primary | ICD-10-CM

## 2020-05-21 PROCEDURE — S0201 PARTIAL HOSPITALIZATION SERV: HCPCS

## 2020-05-21 PROCEDURE — G0176 OPPS/PHP;ACTIVITY THERAPY: HCPCS

## 2020-05-21 PROCEDURE — 90832 PSYTX W PT 30 MINUTES: CPT

## 2020-05-21 PROCEDURE — NC001 PR NO CHARGE: Performed by: PSYCHIATRY & NEUROLOGY

## 2020-05-21 PROCEDURE — G0177 OPPS/PHP; TRAIN & EDUC SERV: HCPCS

## 2020-05-21 PROCEDURE — G0410 GRP PSYCH PARTIAL HOSP 45-50: HCPCS

## 2020-05-22 ENCOUNTER — OFFICE VISIT (OUTPATIENT)
Dept: PSYCHOLOGY | Facility: CLINIC | Age: 55
End: 2020-05-22
Payer: COMMERCIAL

## 2020-05-22 DIAGNOSIS — F33.2 MAJOR DEPRESSIVE DISORDER, RECURRENT SEVERE WITHOUT PSYCHOTIC FEATURES (HCC): Primary | ICD-10-CM

## 2020-05-22 PROCEDURE — G0177 OPPS/PHP; TRAIN & EDUC SERV: HCPCS

## 2020-05-22 PROCEDURE — S0201 PARTIAL HOSPITALIZATION SERV: HCPCS

## 2020-05-22 PROCEDURE — G0176 OPPS/PHP;ACTIVITY THERAPY: HCPCS

## 2020-05-22 PROCEDURE — NC001 PR NO CHARGE: Performed by: PSYCHIATRY & NEUROLOGY

## 2020-05-22 PROCEDURE — G0410 GRP PSYCH PARTIAL HOSP 45-50: HCPCS

## 2020-05-26 ENCOUNTER — OFFICE VISIT (OUTPATIENT)
Dept: PSYCHOLOGY | Facility: CLINIC | Age: 55
End: 2020-05-26
Payer: COMMERCIAL

## 2020-05-26 DIAGNOSIS — F33.2 MAJOR DEPRESSIVE DISORDER, RECURRENT SEVERE WITHOUT PSYCHOTIC FEATURES (HCC): Primary | ICD-10-CM

## 2020-05-26 PROCEDURE — NC001 PR NO CHARGE: Performed by: PSYCHIATRY & NEUROLOGY

## 2020-05-26 PROCEDURE — G0177 OPPS/PHP; TRAIN & EDUC SERV: HCPCS

## 2020-05-26 PROCEDURE — 90834 PSYTX W PT 45 MINUTES: CPT

## 2020-05-26 PROCEDURE — S0201 PARTIAL HOSPITALIZATION SERV: HCPCS

## 2020-05-26 PROCEDURE — G0410 GRP PSYCH PARTIAL HOSP 45-50: HCPCS

## 2020-05-26 PROCEDURE — G0176 OPPS/PHP;ACTIVITY THERAPY: HCPCS

## 2020-05-27 ENCOUNTER — TELEMEDICINE (OUTPATIENT)
Dept: PSYCHIATRY | Facility: CLINIC | Age: 55
End: 2020-05-27
Payer: COMMERCIAL

## 2020-05-27 ENCOUNTER — OFFICE VISIT (OUTPATIENT)
Dept: PSYCHOLOGY | Facility: CLINIC | Age: 55
End: 2020-05-27
Payer: COMMERCIAL

## 2020-05-27 DIAGNOSIS — F33.2 MAJOR DEPRESSIVE DISORDER, RECURRENT SEVERE WITHOUT PSYCHOTIC FEATURES (HCC): Primary | ICD-10-CM

## 2020-05-27 PROCEDURE — S0201 PARTIAL HOSPITALIZATION SERV: HCPCS

## 2020-05-27 PROCEDURE — NC001 PR NO CHARGE: Performed by: PSYCHIATRY & NEUROLOGY

## 2020-05-27 PROCEDURE — G0177 OPPS/PHP; TRAIN & EDUC SERV: HCPCS

## 2020-05-27 PROCEDURE — 90834 PSYTX W PT 45 MINUTES: CPT

## 2020-05-27 PROCEDURE — 99213 OFFICE O/P EST LOW 20 MIN: CPT | Performed by: NURSE PRACTITIONER

## 2020-05-27 PROCEDURE — G0176 OPPS/PHP;ACTIVITY THERAPY: HCPCS

## 2020-05-27 PROCEDURE — G0410 GRP PSYCH PARTIAL HOSP 45-50: HCPCS

## 2020-05-28 ENCOUNTER — OFFICE VISIT (OUTPATIENT)
Dept: PSYCHOLOGY | Facility: CLINIC | Age: 55
End: 2020-05-28
Payer: COMMERCIAL

## 2020-05-28 DIAGNOSIS — F33.2 MAJOR DEPRESSIVE DISORDER, RECURRENT SEVERE WITHOUT PSYCHOTIC FEATURES (HCC): Primary | ICD-10-CM

## 2020-05-28 PROCEDURE — S0201 PARTIAL HOSPITALIZATION SERV: HCPCS

## 2020-05-28 PROCEDURE — NC001 PR NO CHARGE: Performed by: PSYCHIATRY & NEUROLOGY

## 2020-05-28 PROCEDURE — G0176 OPPS/PHP;ACTIVITY THERAPY: HCPCS

## 2020-05-28 PROCEDURE — G0410 GRP PSYCH PARTIAL HOSP 45-50: HCPCS

## 2020-05-28 PROCEDURE — G0177 OPPS/PHP; TRAIN & EDUC SERV: HCPCS

## 2020-05-29 ENCOUNTER — OFFICE VISIT (OUTPATIENT)
Dept: PSYCHOLOGY | Facility: CLINIC | Age: 55
End: 2020-05-29
Payer: COMMERCIAL

## 2020-05-29 DIAGNOSIS — F33.2 MAJOR DEPRESSIVE DISORDER, RECURRENT SEVERE WITHOUT PSYCHOTIC FEATURES (HCC): Primary | ICD-10-CM

## 2020-05-29 PROCEDURE — NC001 PR NO CHARGE: Performed by: PSYCHIATRY & NEUROLOGY

## 2020-05-29 PROCEDURE — S9480 INTENSIVE OUTPATIENT PSYCHIA: HCPCS

## 2020-06-01 ENCOUNTER — OFFICE VISIT (OUTPATIENT)
Dept: PSYCHOLOGY | Facility: CLINIC | Age: 55
End: 2020-06-01
Payer: COMMERCIAL

## 2020-06-01 DIAGNOSIS — F33.2 MAJOR DEPRESSIVE DISORDER, RECURRENT SEVERE WITHOUT PSYCHOTIC FEATURES (HCC): Primary | ICD-10-CM

## 2020-06-01 PROCEDURE — S0201 PARTIAL HOSPITALIZATION SERV: HCPCS

## 2020-06-01 PROCEDURE — 90837 PSYTX W PT 60 MINUTES: CPT

## 2020-06-01 PROCEDURE — NC001 PR NO CHARGE: Performed by: PSYCHIATRY & NEUROLOGY

## 2020-06-01 PROCEDURE — S9480 INTENSIVE OUTPATIENT PSYCHIA: HCPCS

## 2020-06-02 ENCOUNTER — APPOINTMENT (OUTPATIENT)
Dept: PSYCHOLOGY | Facility: CLINIC | Age: 55
End: 2020-06-02
Payer: COMMERCIAL

## 2020-06-02 ENCOUNTER — DOCUMENTATION (OUTPATIENT)
Dept: PSYCHOLOGY | Facility: CLINIC | Age: 55
End: 2020-06-02

## 2020-06-03 ENCOUNTER — TELEMEDICINE (OUTPATIENT)
Dept: PSYCHIATRY | Facility: CLINIC | Age: 55
End: 2020-06-03
Payer: COMMERCIAL

## 2020-06-03 ENCOUNTER — OFFICE VISIT (OUTPATIENT)
Dept: PSYCHOLOGY | Facility: CLINIC | Age: 55
End: 2020-06-03
Payer: COMMERCIAL

## 2020-06-03 DIAGNOSIS — F33.2 MAJOR DEPRESSIVE DISORDER, RECURRENT SEVERE WITHOUT PSYCHOTIC FEATURES (HCC): Primary | ICD-10-CM

## 2020-06-03 PROCEDURE — 99214 OFFICE O/P EST MOD 30 MIN: CPT | Performed by: NURSE PRACTITIONER

## 2020-06-03 PROCEDURE — S9480 INTENSIVE OUTPATIENT PSYCHIA: HCPCS

## 2020-06-03 PROCEDURE — S0201 PARTIAL HOSPITALIZATION SERV: HCPCS

## 2020-06-03 PROCEDURE — NC001 PR NO CHARGE: Performed by: PSYCHIATRY & NEUROLOGY

## 2020-06-03 PROCEDURE — 90834 PSYTX W PT 45 MINUTES: CPT

## 2020-06-04 ENCOUNTER — OFFICE VISIT (OUTPATIENT)
Dept: PSYCHOLOGY | Facility: CLINIC | Age: 55
End: 2020-06-04
Payer: COMMERCIAL

## 2020-06-04 DIAGNOSIS — F33.2 MAJOR DEPRESSIVE DISORDER, RECURRENT SEVERE WITHOUT PSYCHOTIC FEATURES (HCC): Primary | ICD-10-CM

## 2020-06-04 PROCEDURE — S9480 INTENSIVE OUTPATIENT PSYCHIA: HCPCS

## 2020-06-04 PROCEDURE — NC001 PR NO CHARGE: Performed by: PSYCHIATRY & NEUROLOGY

## 2020-06-05 ENCOUNTER — OFFICE VISIT (OUTPATIENT)
Dept: PSYCHOLOGY | Facility: CLINIC | Age: 55
End: 2020-06-05
Payer: COMMERCIAL

## 2020-06-05 DIAGNOSIS — F33.2 MAJOR DEPRESSIVE DISORDER, RECURRENT SEVERE WITHOUT PSYCHOTIC FEATURES (HCC): Primary | ICD-10-CM

## 2020-06-05 PROCEDURE — G0176 OPPS/PHP;ACTIVITY THERAPY: HCPCS

## 2020-06-05 PROCEDURE — S0201 PARTIAL HOSPITALIZATION SERV: HCPCS

## 2020-06-05 PROCEDURE — G0410 GRP PSYCH PARTIAL HOSP 45-50: HCPCS

## 2020-06-05 PROCEDURE — G0177 OPPS/PHP; TRAIN & EDUC SERV: HCPCS

## 2020-06-05 PROCEDURE — NC001 PR NO CHARGE: Performed by: PSYCHIATRY & NEUROLOGY

## 2020-06-08 ENCOUNTER — OFFICE VISIT (OUTPATIENT)
Dept: PSYCHOLOGY | Facility: CLINIC | Age: 55
End: 2020-06-08
Payer: COMMERCIAL

## 2020-06-08 DIAGNOSIS — F33.2 MAJOR DEPRESSIVE DISORDER, RECURRENT SEVERE WITHOUT PSYCHOTIC FEATURES (HCC): Primary | ICD-10-CM

## 2020-06-08 PROCEDURE — S9480 INTENSIVE OUTPATIENT PSYCHIA: HCPCS

## 2020-06-08 PROCEDURE — NC001 PR NO CHARGE: Performed by: PSYCHIATRY & NEUROLOGY

## 2020-06-08 PROCEDURE — S0201 PARTIAL HOSPITALIZATION SERV: HCPCS

## 2020-06-08 PROCEDURE — 90832 PSYTX W PT 30 MINUTES: CPT

## 2020-06-09 ENCOUNTER — DOCUMENTATION (OUTPATIENT)
Dept: PSYCHOLOGY | Facility: CLINIC | Age: 55
End: 2020-06-09

## 2020-06-10 ENCOUNTER — TELEMEDICINE (OUTPATIENT)
Dept: PSYCHIATRY | Facility: CLINIC | Age: 55
End: 2020-06-10
Payer: COMMERCIAL

## 2020-06-10 ENCOUNTER — OFFICE VISIT (OUTPATIENT)
Dept: PSYCHOLOGY | Facility: CLINIC | Age: 55
End: 2020-06-10
Payer: COMMERCIAL

## 2020-06-10 ENCOUNTER — TELEPHONE (OUTPATIENT)
Dept: PSYCHIATRY | Facility: CLINIC | Age: 55
End: 2020-06-10

## 2020-06-10 DIAGNOSIS — F33.2 MAJOR DEPRESSIVE DISORDER, RECURRENT SEVERE WITHOUT PSYCHOTIC FEATURES (HCC): Primary | ICD-10-CM

## 2020-06-10 PROCEDURE — S9480 INTENSIVE OUTPATIENT PSYCHIA: HCPCS

## 2020-06-10 PROCEDURE — NC001 PR NO CHARGE: Performed by: PSYCHIATRY & NEUROLOGY

## 2020-06-10 PROCEDURE — 99214 OFFICE O/P EST MOD 30 MIN: CPT | Performed by: NURSE PRACTITIONER

## 2020-06-12 ENCOUNTER — OFFICE VISIT (OUTPATIENT)
Dept: PSYCHOLOGY | Facility: CLINIC | Age: 55
End: 2020-06-12
Payer: COMMERCIAL

## 2020-06-12 ENCOUNTER — DOCUMENTATION (OUTPATIENT)
Dept: PSYCHOLOGY | Facility: CLINIC | Age: 55
End: 2020-06-12

## 2020-06-12 DIAGNOSIS — F33.2 MAJOR DEPRESSIVE DISORDER, RECURRENT SEVERE WITHOUT PSYCHOTIC FEATURES (HCC): Primary | ICD-10-CM

## 2020-06-12 PROCEDURE — S9480 INTENSIVE OUTPATIENT PSYCHIA: HCPCS

## 2020-06-12 PROCEDURE — NC001 PR NO CHARGE: Performed by: PSYCHIATRY & NEUROLOGY

## 2020-06-15 ENCOUNTER — DOCUMENTATION (OUTPATIENT)
Dept: PSYCHOLOGY | Facility: CLINIC | Age: 55
End: 2020-06-15

## 2020-06-15 ENCOUNTER — APPOINTMENT (OUTPATIENT)
Dept: PSYCHOLOGY | Facility: CLINIC | Age: 55
End: 2020-06-15
Payer: COMMERCIAL

## 2020-06-15 PROCEDURE — 90832 PSYTX W PT 30 MINUTES: CPT

## 2020-06-15 PROCEDURE — S0201 PARTIAL HOSPITALIZATION SERV: HCPCS

## 2020-06-16 ENCOUNTER — OFFICE VISIT (OUTPATIENT)
Dept: PSYCHOLOGY | Facility: CLINIC | Age: 55
End: 2020-06-16
Payer: COMMERCIAL

## 2020-06-16 DIAGNOSIS — F33.2 MAJOR DEPRESSIVE DISORDER, RECURRENT SEVERE WITHOUT PSYCHOTIC FEATURES (HCC): Primary | ICD-10-CM

## 2020-06-16 PROCEDURE — S9480 INTENSIVE OUTPATIENT PSYCHIA: HCPCS

## 2020-06-16 PROCEDURE — NC001 PR NO CHARGE: Performed by: PSYCHIATRY & NEUROLOGY

## 2020-06-17 ENCOUNTER — OFFICE VISIT (OUTPATIENT)
Dept: PSYCHOLOGY | Facility: CLINIC | Age: 55
End: 2020-06-17
Payer: COMMERCIAL

## 2020-06-17 DIAGNOSIS — F33.2 MAJOR DEPRESSIVE DISORDER, RECURRENT SEVERE WITHOUT PSYCHOTIC FEATURES (HCC): Primary | ICD-10-CM

## 2020-06-17 PROCEDURE — NC001 PR NO CHARGE: Performed by: PSYCHIATRY & NEUROLOGY

## 2020-06-17 PROCEDURE — S9480 INTENSIVE OUTPATIENT PSYCHIA: HCPCS

## 2020-06-18 ENCOUNTER — APPOINTMENT (OUTPATIENT)
Dept: PSYCHOLOGY | Facility: CLINIC | Age: 55
End: 2020-06-18
Payer: COMMERCIAL

## 2020-06-18 ENCOUNTER — DOCUMENTATION (OUTPATIENT)
Dept: PSYCHOLOGY | Facility: CLINIC | Age: 55
End: 2020-06-18

## 2020-06-18 DIAGNOSIS — F33.2 MAJOR DEPRESSIVE DISORDER, RECURRENT SEVERE WITHOUT PSYCHOTIC FEATURES (HCC): Primary | ICD-10-CM

## 2020-06-18 PROCEDURE — S0201 PARTIAL HOSPITALIZATION SERV: HCPCS

## 2020-06-18 PROCEDURE — 90832 PSYTX W PT 30 MINUTES: CPT

## 2020-06-19 ENCOUNTER — OFFICE VISIT (OUTPATIENT)
Dept: PSYCHOLOGY | Facility: CLINIC | Age: 55
End: 2020-06-19
Payer: COMMERCIAL

## 2020-06-19 DIAGNOSIS — F33.2 MAJOR DEPRESSIVE DISORDER, RECURRENT SEVERE WITHOUT PSYCHOTIC FEATURES (HCC): Primary | ICD-10-CM

## 2020-06-19 PROCEDURE — S9480 INTENSIVE OUTPATIENT PSYCHIA: HCPCS

## 2020-06-19 PROCEDURE — NC001 PR NO CHARGE: Performed by: PSYCHIATRY & NEUROLOGY

## 2020-06-19 PROCEDURE — S0201 PARTIAL HOSPITALIZATION SERV: HCPCS

## 2020-06-19 PROCEDURE — 90832 PSYTX W PT 30 MINUTES: CPT

## 2021-01-06 ENCOUNTER — TELEPHONE (OUTPATIENT)
Dept: PSYCHIATRY | Facility: CLINIC | Age: 56
End: 2021-01-06

## 2021-01-06 NOTE — TELEPHONE ENCOUNTER
Kevin Pereira left a message that he was in the [facility]'s program and his GP thinks he needs an evaluation and a medication change  I did return the call to let him know the message would be forwarded to Intake, but I could not leave a message

## 2021-01-07 NOTE — TELEPHONE ENCOUNTER
Patient just called here I sent him the intake assessment form  We will proceed with the admission after the review   Will let you know if we schedule him for elena Maria

## 2021-01-07 NOTE — TELEPHONE ENCOUNTER
Behavorial Health Outpatient Intake Questions    Referred by: PCP    Please advised interviewee that they need to answer all questions truthfully to allow for best care and any misrepresentations of information may affect their ability to be seen at this clinic   => Was this discussed? Yes     [facility] -     Presenting Problem (in patient's words): Patient was in partial  Depression, anxiety and SI    Are there any developmental disabilities? ? If yes, can they speak to you on the phone? If they are too limited to speak to you on phone, refer out No    Are you taking any psychiatric medications? Yes    => If yes, who prescribes? If yes, are they injectable medications? Buproprion 150 mg in the morning   Duloxetine HCL DR 60 mg at night  Hydroxezine HCL 50 mg as needed      Does the patient have a language barrier or hearing impairment? No    Have you been treated at [facility] by a therapist or a doctor in the past? If yes, who? No    Has the patient been hospitalized for mental health? Yes   If yes, how long ago was last hospitalization and where was it? [address]     Do you actively use alcohol or marijuana or illegal substances? If yes, what and how much - refer out to Drug and alcohol treatment if use is excessive or daily use of illegal substances No concerns of substance abuse are reported  Do you have a community treatment team or ? No    Legal History-     Does the patient have any history of arrests, MCC/prison time, or DUIs? No  If Yes-  1) What types of charges? 2) When were they last incarcerated? 3) Are they currently on parole or probation? Minor Child-    Who has custody of the child? Is there a custody agreement? If there is a custody agreement remind parent that they must bring a copy to the first appt or they will not be seen       Intake Team, please check with provider before scheduling if flags come up such as:  - complex case  - legal history (other than DUI)  - communication barrier concerns are present  - if, in your judgment, this needs further review    ACCEPTED as a patient Yes  => Appointment Date: 3/2/21 at 9:30 am with Gypsy Mane    Referred Elsewhere? Name of Insurance Co: Ariana Guaman ID# [id number]  [id number] Phone #  If ins is primary or secondary  If patient is a minor, parents information such as Name, D  O B of guarantor

## 2021-01-12 ENCOUNTER — OFFICE VISIT (OUTPATIENT)
Dept: PSYCHOLOGY | Facility: CLINIC | Age: 56
End: 2021-01-12
Payer: COMMERCIAL

## 2021-01-12 ENCOUNTER — OFFICE VISIT (OUTPATIENT)
Dept: PSYCHIATRY | Facility: CLINIC | Age: 56
End: 2021-01-12
Payer: COMMERCIAL

## 2021-01-12 VITALS
DIASTOLIC BLOOD PRESSURE: 90 MMHG | RESPIRATION RATE: 18 BRPM | TEMPERATURE: 97.8 F | HEART RATE: 92 BPM | HEIGHT: 71 IN | BODY MASS INDEX: 40.18 KG/M2 | SYSTOLIC BLOOD PRESSURE: 153 MMHG | WEIGHT: 287 LBS

## 2021-01-12 DIAGNOSIS — F33.2 MAJOR DEPRESSIVE DISORDER, RECURRENT SEVERE WITHOUT PSYCHOTIC FEATURES (HCC): Primary | ICD-10-CM

## 2021-01-12 PROCEDURE — S0201 PARTIAL HOSPITALIZATION SERV: HCPCS

## 2021-01-12 PROCEDURE — 90791 PSYCH DIAGNOSTIC EVALUATION: CPT

## 2021-01-12 PROCEDURE — 90792 PSYCH DIAG EVAL W/MED SRVCS: CPT | Performed by: PSYCHIATRY & NEUROLOGY

## 2021-01-12 PROCEDURE — 3008F BODY MASS INDEX DOCD: CPT | Performed by: NURSE PRACTITIONER

## 2021-01-12 RX ORDER — BUPROPION HYDROCHLORIDE 300 MG/1
300 TABLET ORAL DAILY
Qty: 30 TABLET | Refills: 0 | Status: SHIPPED | OUTPATIENT
Start: 2021-01-12 | End: 2021-02-01

## 2021-01-12 RX ORDER — HYDROXYZINE 50 MG/1
50 TABLET, FILM COATED ORAL DAILY PRN
COMMUNITY
End: 2022-07-28 | Stop reason: ALTCHOICE

## 2021-01-12 NOTE — PSYCH
This note was not shared with the patient due to patient requested  Reason for visit:   Chief Complaint   Patient presents with    Anxiety    Depression       HPI     Nyasia Stiles is a 54 y o  male with depression , anxiety, type 2 diabetes mellitus, hypertension referred by Sheila Martin , his therapist because he is feeling very depressed, anxious, suicidal ideation without plan or intent  He is very anxious secondary to the increased COVID-19 case and people done wear the PPE  Onset of symptoms was  a few months ago with gradually worsening course since that time  Psychosocial Stressors: financial, health and  COVID-19 pandemia   He states that he had been struggling since the pandemic started, he gets very upset with people that refused to wear the mask or keep the distance and he is scared that he get the disease and bring home to his wife has some medical issues  She is doing better but she is working very hard  He has not been able to work since August after he have a right foot fracture  And this is creating financial issues, he also worries about his mother who is in a nursing home  , she had dementia   He states that in December he overdosed with few medication but he did not have any major side effects other than abdominal pain, he talked to his primary doctor who connect him with a therapist   He had an obsession with the COVID-19 , he passed the whole day watching news looking for the numbers of new cases  He has difficulty enjoying things, he feels hopeless, he had a sleep difficulties, he had difficulty concentration he has on and off  suicidal ideation   Shearon Sees feels very depressed and very anxious, denies any active suicidal thoughts plans or intent, denies any psychotic symptoms or manic episodes  His PHQ-9 is 17        Review Of Systems:     Mood Anxiety and Depression   Behavior Normal    Thought Content Normal   General Emotional Problems and Decreased Functioning Personality Normal   Other Psych Symptoms Normal   Constitutional Negative   ENT Negative   Cardiovascular Negative   Respiratory Negative   Gastrointestinal Negative   Genitourinary Negative   Musculoskeletal Negative   Integumentary Negative   Neurological Negative   Endocrine Normal    Other Symptoms Normal        Past Psychiatric History:      Past Inpatient Psychiatric Treatment:   He denies any inpatient psych admission, he was in partial hospitalization in May 13, 2020  Past Outpatient Psychiatric Treatment:    He follows with his therapist  Past Suicide Attempts:    yes  Past Violent Behavior:    no  Past Psychiatric Medication Trials:    Zoloft, Cymbalta, Wellbutrin XL and Neurontin    Family Psychiatric History:   Family History   Problem Relation Age of Onset    Stroke Mother     Psychiatric Illness Neg Hx        Social History:    Education: technical college  Learning Disabilities: None  Marital history:   Living arrangement, social support: lives with his wife and his daughter is in the house to  Occupational History:  He is employed  Functioning Relationships: good support system  Other Pertinent History: No legal or  history    Social History     Substance and Sexual Activity   Drug Use No       Traumatic History:       Abuse: He denies any history of abuse  Other Traumatic Events: None    The following portions of the patient's history were reviewed and updated as appropriate:   He  has a past medical history of Anxiety, Depression, Diabetes mellitus ([facility] ), and Hypertension    He   Patient Active Problem List    Diagnosis Date Noted    Major depressive disorder, recurrent severe without psychotic features ([facility] ) 05/14/2020    Obesity 11/12/2019    Chronic right-sided low back pain with right-sided sciatica 02/17/2017    Herniated nucleus pulposus, L3-4 right 11/30/2016    Diabetes mellitus type 2 with neurological manifestations ([facility] ) 02/09/2016    Mixed hyperlipidemia 02/09/2016    Degeneration of intervertebral disc of lumbar region 12/10/2013    Lumbar canal stenosis 12/10/2013    Spondylosis of lumbosacral joint without myelopathy 12/10/2013     He  has a past surgical history that includes Back surgery  His family history includes Stroke in his mother  He  reports that he has been smoking  He has never used smokeless tobacco  He reports current alcohol use  He reports that he does not use drugs  Current Outpatient Medications   Medication Sig Dispense Refill    amLODIPine (NORVASC) 5 mg tablet Take 5 mg by mouth daily      atorvastatin (LIPITOR) 40 mg tablet Take 40 mg by mouth daily      buPROPion (WELLBUTRIN XL) 300 mg 24 hr tablet Take 1 tablet (300 mg total) by mouth daily 30 tablet 0    DULoxetine (CYMBALTA) 60 mg delayed release capsule Take 60 mg by mouth daily      gabapentin (NEURONTIN) 600 MG tablet Take 600 mg by mouth 3 (three) times a day      hydrochlorothiazide (HYDRODIURIL) 25 mg tablet Take 25 mg by mouth daily      hydrOXYzine HCL (ATARAX) 50 mg tablet Take 50 mg by mouth daily as needed for anxiety Half to 1 tablet daily p r n  For severe anxiety      insulin lispro (HumaLOG) 100 units/mL by Subcutaneous Insulin Pump route as needed      lisinopril (ZESTRIL) 40 mg tablet Take 40 mg by mouth daily      metFORMIN (GLUCOPHAGE) 1000 MG tablet Take 1,000 mg by mouth 2 (two) times a day with meals      metoprolol succinate (TOPROL-XL) 50 mg 24 hr tablet Take 25 mg by mouth daily      Multiple Vitamin (MULTIVITAMIN) tablet Take 1 tablet by mouth daily      tamsulosin (FLOMAX) 0 4 mg Take 0 4 mg by mouth daily with dinner       No current facility-administered medications for this visit  He is allergic to morphine; tetanus toxoid; and tetanus-diphth-acell pertussis [tetanus-diphth-acell pertussis]          Mental status:  Appearance calm and cooperative , adequate hygiene and grooming and good eye contact    Mood depressed and anxious Affect affect appropriate    Speech a normal rate   Thought Processes coherent/organized and normal thought processes   Hallucinations no hallucinations present    Thought Content no delusions   Abnormal Thoughts no suicidal thoughts  and no homicidal thoughts    Orientation  oriented to person and place and time   Remote Memory short term memory intact and long term memory intact   Attention Span concentration intact   Intellect Appears to be of Average Intelligence   Fund of Knowledge displays adequate knowledge of current events, adequate fund of knowledge regarding past history and adequate fund of knowledge regarding vocabulary    Insight Insight intact   Judgement judgment was intact   Muscle Strength Muscle strength and tone were normal and Normal gait    Language no difficulty naming common objects, no difficulty repeating a phrase  and no difficulty writing a sentence    Pain none   Pain Scale 0         Laboratory Results: No results found for this or any previous visit  Assessment/Plan:      Diagnoses and all orders for this visit:    Major depressive disorder, recurrent severe without psychotic features (HCC)  -     buPROPion (WELLBUTRIN XL) 300 mg 24 hr tablet; Take 1 tablet (300 mg total) by mouth daily    Other orders  -     hydrOXYzine HCL (ATARAX) 50 mg tablet; Take 50 mg by mouth daily as needed for anxiety Half to 1 tablet daily p r n   For severe anxiety          Treatment Recommendations- Risks Benefits         Immediate Medical/Psychiatric/Psychotherapy Treatments and Any Precautions:     Admit to [facility], medication management, group therapy    Risks, Benefits And Possible Side Effects Of Medications:  Risks, benefits, and possible side effects of medications explained to patient and patient verbalizes understanding    Controlled Medication Discussion: No records found for controlled prescriptions according to [address]        [facility] Physician's Orders     Admit to: Partial Hospitalization, 5 x per week, for 15 days  Vital signs routine  Diet diabetic diet   Group Psychotherapy 9 x per week  Allied Therapy Group 6 x per week  Diagnosis:   1  Major depressive disorder, recurrent severe without psychotic features ([facility] )  buPROPion (WELLBUTRIN XL) 300 mg 24 hr tablet     Medications:   Current Outpatient Medications:     amLODIPine (NORVASC) 5 mg tablet, Take 5 mg by mouth daily, Disp: , Rfl:     atorvastatin (LIPITOR) 40 mg tablet, Take 40 mg by mouth daily, Disp: , Rfl:     buPROPion (WELLBUTRIN XL) 300 mg 24 hr tablet, Take 1 tablet (300 mg total) by mouth daily, Disp: 30 tablet, Rfl: 0    DULoxetine (CYMBALTA) 60 mg delayed release capsule, Take 60 mg by mouth daily, Disp: , Rfl:     gabapentin (NEURONTIN) 600 MG tablet, Take 600 mg by mouth 3 (three) times a day, Disp: , Rfl:     hydrochlorothiazide (HYDRODIURIL) 25 mg tablet, Take 25 mg by mouth daily, Disp: , Rfl:     hydrOXYzine HCL (ATARAX) 50 mg tablet, Take 50 mg by mouth daily as needed for anxiety Half to 1 tablet daily p r n   For severe anxiety, Disp: , Rfl:     insulin lispro (HumaLOG) 100 units/mL, by Subcutaneous Insulin Pump route as needed, Disp: , Rfl:     lisinopril (ZESTRIL) 40 mg tablet, Take 40 mg by mouth daily, Disp: , Rfl:     metFORMIN (GLUCOPHAGE) 1000 MG tablet, Take 1,000 mg by mouth 2 (two) times a day with meals, Disp: , Rfl:     metoprolol succinate (TOPROL-XL) 50 mg 24 hr tablet, Take 25 mg by mouth daily, Disp: , Rfl:     Multiple Vitamin (MULTIVITAMIN) tablet, Take 1 tablet by mouth daily, Disp: , Rfl:     tamsulosin (FLOMAX) 0 4 mg, Take 0 4 mg by mouth daily with dinner, Disp: , Rfl:     I certify that the continuation of Partial Hospitalization services is medically necessary to improve and/or maintain the patients condition and functional level, and to prevent relapse or hospitalization, and that this could not be done at a less intensive level of care  Sonya Lance MD

## 2021-01-12 NOTE — PSYCH
This note was not shared with the patient due to this is a psychotherapy note   Subjective:     Patient ID: Mercedes Mallory is a 54 y o  male  [facility] Clinical Progress Notes      Specialized Services Documentation  Therapist must complete separate progress note for each specific clinical activity in which the individual participated during the day  Other Pre-Authorization: Spoke with Herman Morris at [facility] - phone [phone]  7 days authorized from 1/13/2021 to 01/21/2021  Authorization number [id number]  Reviewer assigned tba - [phone]  DELON Diane    Case Management Note    Carlos JERNIGAN    Current suicide risk : Low     [id number] Met with Mercedes Mallory  Reviewed program and given on call number  he completed releases and OP providers/ PCP notified of admission and health care coordination form completed  Completed initial psycho-social evaluation and initial treatment goals discussed  Medications changes/added/denied? Yes     Treatment session number: evaluation only    Individual Case Management Visit provided today?  Yes     [facility] follow up physician's orders: see Dr Dante Morrissey MD

## 2021-01-12 NOTE — PSYCH
Assessment/Plan:      Diagnoses and all orders for this visit:    Major depressive disorder, recurrent severe without psychotic features ([facility] )          Subjective:     Patient ID: Abigail Tyler is a 54 y o  male  HPI:     Pre-morbid level of function and History of Present Illness:   Per Dr Sav Soares:   Abigail Tyler is a 54 y o  male with depression , anxiety, type 2 diabetes mellitus, hypertension referred by Neida Peralta , his therapist because he is feeling very depressed, anxious, suicidal ideation without plan or intent  He is very anxious secondary to the increased COVID-19 case and people done wear the PPE  Onset of symptoms was  a few months ago with gradually worsening course since that time  Psychosocial Stressors: financial, health and  COVID-19 pandemia   He states that he had been struggling since the pandemic started, he gets very upset with people that refused to wear the mask or keep the distance and he is scared that he get the disease and bring home to his wife has some medical issues  She is doing better but she is working very hard  He has not been able to work since August after he have a right foot fracture  And this is creating financial issues, he also worries about his mother who is in a nursing home  , she had dementia   He states that in December he overdosed with few medication but he did not have any major side effects other than abdominal pain, he talked to his primary doctor who connect him with a therapist   He had an obsession with the COVID-19 , he passed the whole day watching news looking for the numbers of new cases  He has difficulty enjoying things, he feels hopeless, he had a sleep difficulties, he had difficulty concentration he has on and off  suicidal ideation   Julius Terrazas feels very depressed and very anxious, denies any active suicidal thoughts plans or intent, denies any psychotic symptoms or manic episodes  His PHQ-9 is 17      Per this writer: Elizabeth Leal [facility] by OP provider due to increased depression without stabilization at lower level of care  Leo Rodney reports inability to return to work due to a foot injury in August - disability claim issues/confusion and he stopped getting paid in December - with the holidays he began to ruminate and became increasingly depressed  He has not been leaving the home and fears COVID-19  Due to his health issues and his wife's health concerns, he is immobilized by fears that he will bring it home  He watches the news daily to follow the case counts (multiple times a day)  He presented wearing 2 masks and rubber gloves  He has on/off SI, hopelessness, sleep disturbance, anhedonia, unable to see a positive future, and in December took an OD of medications  Per Leo Rashaun: "I'm in a downward spiral"    Strengths: family support, wants to get better    Reason for evaluation and partial hospitalization as an alternative to inpatient hospitalization   PHP is medically necessary to prevent hospitalization as outpatient care has been unable to stabilize Elizabeth Leal and a greater intensity of treatment is indicated  Milieu therapy to monitor for medication needs, provide wellness tools education and offer opportunity to share and connect to others  Group therapy, case management, psychiatric medication management, family contact and UR as indicated  ELOS 10 treatment days  Previous Psychiatric/psychological treatment/year:   Current Psychiatrist/Therapist: PCP medications - set up with Pippa Hoffman [place] (just started with him last week)  Outpatient and/or Partial and Other [employer] Used (CTT, ICM, VNA): PHP in May 2020; some prior counseling in the past      Problem Assessment:     SOCIAL/VOCATION:  Family Constellation (include parents, relationship with each and pertinent Psych/Medical History):     Family History   Problem Relation Age of Onset    Stroke Mother     Psychiatric Illness Neg Hx        Mother: mom in SNF - he is POA  Spouse: Akilah - good relationship   Father:    Children: Salbador Zavaleta - lives in [place]; [place]   Sibling: brother       Who is the person you relate to best wife  he lives with wife and daughter  he does not live alone  Domestic Violence: denied    Additional Comments related to family/relationships/peer support:   Liat España reported being  to his wife for 28 years this upcoming Saturday  He has two daughters, one living in [place] and the youngest living in his home  He reported she is struggling with anxiety and she is not doing well  "The apple doesn't fall far from the tree," he stated regarding his daughter  Diandra Nascimento is the Power of  for his mother who has Alzheimer's  She is residing in a nursing home and tested positive for COVID-19 but is asymptomatic  Diandra Nascimento reported he has a positive network of friends because he is a   Denied any traumatic events  School or Work History (strengths/limitations/needs): Has worked as a  for the last 19 years with the same company  Her highest grade level achieved was Technical school      history includes none    Financial status includes stressor    LEISURE ASSESSMENT (Include past and present hobbies/interests and level of involvement (Ex: Group/Club Affiliations): music; outdoor work  Her primary language is [place]  Preferred language is [place]  Ethnic considerations are   Religions affiliations and level of involvement raised Orthodoxy, became [employer], currently not actively practicing but has aviva           FUNCTIONAL STATUS: There has been a recent change in the patient's ability to do the following: decrease in ADLs; overeating; limited sleep    Level of Assistance Needed/By Whom?: independent    Diandra Nascimento learns best by  listening, demonstration and picture    SUBSTANCE ABUSE ASSESSMENT: no substance abuse    Do you currently smoke? NoOffered smoking cessation? na    Substance/Route/Age/Amount/Frequency/Last Use: na    DETOX HISTORY: na    Previous detox/rehab treatment: na    HEALTH ASSESSMENT:   No significant medical     Lola Leon DO  [address] [place] [address] [address] [facility] [address] [facility]        LEGAL: No Mental Health Advance Directive or Power of  on file, Information packet given about [address] [place] [address] and no additional legal    Risk Assessment:   The following ratings are based on my observation of this patient over the last interview today and prior treatment    Risk of Harm to Self:   Demographic risk factors include , Gnosticist and male  Historical Risk Factors include na  Recent Specific Risk Factors include passive death wishes, experienced fleeting ideation, unable to visualize a realistic positive future, feelings of guilt or self blame, worries about finances or work and diagnosis of depression     Risk of Harm to Others:   Demographic Risk Factors include male  Historical Risk Factors include na  Recent Specific Risk Factors include multiple stressors    Access to Weapons:   Leo Rodney has access to the following weapons: denied   The following steps have been taken to ensure weapons are properly secured: na    Based on the above information, the client presents the following risk of harm to self or others:  low    The following interventions are recommended:   no intervention changes    Notes regarding this Risk Assessment: on call number and county crisis given    Review Of Systems:     Mood Anxiety and Depression   Behavior Normal    Thought Content Normal   General Emotional Problems and Decreased Functioning   Personality Normal   Other Psych Symptoms Normal   Constitutional Negative   ENT Negative   Cardiovascular Negative   Respiratory Negative   Gastrointestinal Negative   Genitourinary Negative   Musculoskeletal Negative   Integumentary Negative   Neurological Negative   Endocrine Normal    Other Symptoms Normal           Mental status:  Appearance calm and cooperative , adequate hygiene and grooming and good eye contact    Mood depressed and anxious   Affect affect appropriate    Speech a normal rate   Thought Processes coherent/organized and normal thought processes   Hallucinations no hallucinations present    Thought Content no delusions   Abnormal Thoughts no suicidal thoughts  and no homicidal thoughts    Orientation  oriented to person and place and time   Remote Memory short term memory intact and long term memory intact   Attention Span concentration intact   Intellect Appears to be of Average Intelligence   Fund of Knowledge displays adequate knowledge of current events, adequate fund of knowledge regarding past history and adequate fund of knowledge regarding vocabulary    Insight Insight intact   Judgement judgment was intact   Muscle Strength Muscle strength and tone were normal and Normal gait    Language no difficulty naming common objects, no difficulty repeating a phrase  and no difficulty writing a sentence    Pain none   Pain Scale 0        DSM:   1  Major depressive disorder, recurrent severe without psychotic features ([address] )         Plan: Admit to PHP  Group therapy, case management, medication management, UR and family contact as indicated    ELOS 10 treatment days  Refer to OP psychiatry and therapy     ALL INTERVENTIONS ARE VIRTUAL DUE TO COVID-19    Anticipated aftercare plan: OP therapist and medication management

## 2021-01-12 NOTE — PSYCH
Assessment/Plan:      Diagnoses and all orders for this visit:    Major depressive disorder, recurrent severe without psychotic features ([facility] [address] )          Subjective:     Patient ID: Caitlin Bowen is a 54 y o  male  [facility] Plan   AREAS OF NEED: Depression as evidenced by anhedonia, lack of interest, negative thoughts with hopelessness, anxiety, isolation, sleep disturbance related to COVID-19 and work/financial stressors  Date Initiated: 1/12/2021    Strengths: family support, wants to improve     LONG TERM GOAL:   Date Initiated: 1/12/2021  1 0 I will identify 3 signs that my depression has lessened and I am more productive in my day to day life  Target Date: 2/9/2021  Completion Date:       SHORT TERM OBJECTIVES:     Date Initiated: 1/12/2021  1 1 I will identify 3 mindfulness and/or distress tolerance skills I am practicing to refocus negative and/or fear thoughts  Revision Date:   Target Date: 1/22/2021  Completion Date:     Date Initiated: 1/12/2021  1 2 I will identify 3 things I need to do daily to increase personal structure and 2 things I may need to avoid throughout my day  Revision Date:   Target Date: 1/22/2021  Completion Date:     Date Initiated: 1/12/2021  1 3 I will take medications as prescribed and share questions and concerns if arise  Revision Date:   Target Date: 1/22/2021  Completion Date:      Date Initiated: 1/12/2021  1 4 I will identify 3 ways my supports can assist in my recovery and agree to staff/support contact as indicated      Revision Date:   Target Date: 1/22/2021  Completion Date:            7 DAY REVISION:    Date Initiated:  Revision Date:   Target Date:   Completion Date:      PSYCHIATRY:  Date Initiated:   Medication Management and Education       Revision Date:   The person(s) responsible for carrying out the plan is Merilynn Dandy, MD    NURSING:   Date Initiated:   1 1,1 2,1 3,1 4 This RN will provide daily wellness group five days weekly to educate Elizabeth Leal on S/S of his diagnoses and medications used in treatment  Revision Date:  The person(s) responsible for carrying out the plan is Ernie Nicole RN    PSYCHOLOGY:   Date Initiated:        1 1, 1 2, 1 4 Provide psychotherapy group 5 times per week to allow opportunity for Elizabeth Leal  to explore stressors and ways of coping  Revision Date:   The person(s) responsible for carrying out the plan is Desmond Redding    ALLIED THERAPY:   Date Initiated:   1 1,1 2 Engage Elizabeth Leal in AT group 5 times daily to encourage development and use of wellness tools to decrease symptoms and promote recovery through meaningful activity  Revision Date:   The person(s) responsible for carrying out the plan is DELON Branch     CASE MANAGEMENT:   Date Initiated:       1 0 This  will meet with Elizabeth Leal  3-4 times weekly to assess treatment progress, discharge planning, connection to community supports and UR as indicated  Revision Date:   The person(s) responsible for carrying out the plan is Ivett JERNIGAN      TREATMENT REVIEW/COMMENTS:     DISCHARGE CRITERIA: Identify 3 signs of progress and complete relapse prevention plan  DISCHARGE PLAN: OP providers  Estimated Length of Stay:10 treatment days    Diagnosis and Treatment Plan explained to Solon Cheadle relates understanding diagnosis and is agreeable to Treatment Plan         CLIENT COMMENTS / Please share your thoughts, feelings, need and/or experiences regarding your treatment plan: _____________________________________________________________________________________________________________________________________________________________________________________________________________________________________________________________________________________________________________________ Date/Time: ______________     Patient Signature: _________________________________     Date/Time: ______________      Signature: _________________________________     Date/Time: ______________

## 2021-01-13 ENCOUNTER — OFFICE VISIT (OUTPATIENT)
Dept: PSYCHOLOGY | Facility: CLINIC | Age: 56
End: 2021-01-13
Payer: COMMERCIAL

## 2021-01-13 DIAGNOSIS — F33.2 MAJOR DEPRESSIVE DISORDER, RECURRENT SEVERE WITHOUT PSYCHOTIC FEATURES (HCC): Primary | ICD-10-CM

## 2021-01-13 PROCEDURE — S0201 PARTIAL HOSPITALIZATION SERV: HCPCS

## 2021-01-13 PROCEDURE — G0176 OPPS/PHP;ACTIVITY THERAPY: HCPCS

## 2021-01-13 PROCEDURE — G0410 GRP PSYCH PARTIAL HOSP 45-50: HCPCS

## 2021-01-13 NOTE — PSYCH
This note was not shared with the patient due to this is a psychotherapy note   Subjective:     Patient ID: Jacey Goldman is a 54 y o  male  [facility] Clinical Progress Notes      Specialized Services Documentation  Therapist must complete separate progress note for each specific clinical activity in which the individual participated during the day  Allied Therapy   This group was facilitated virtually in a private office using [employer] and Approved [employer] Teams  Jacey Goldman consented to the use of tele-video modality of treatment  [id number] Jacey Goldman  actively shared in [facility] group exploring DBT skill changing emotional responses  Memo Bey engaged in task sharing triggers and responses to given emotions  Group explored differences between justified and unjustified emotions to prompting events and effective versus ineffective responses  Group reviewed skill Opposite Action related to depression withdraw versus get active and productive choices for unstructured time offered  Positive initial effort and progress noted toward goals  Continue AT to explore healthy emotional regulation and role in practicing wellness tools  Tx Plan Objective: 1 2, Therapist:  Priscila MARTINEZBC      Case Management Note  This case management session was facilitated virtually in a private office using HIPPA Compliant and Approved [employer] Teams  Jacey Goldman consented to the use of tele-video modality of treatment  Priscila Kilgore MT-BC    Current suicide risk : Low     [id number] Met with Jacey Goldman  Reported that he had a good first day  He feels he "needs the structure"  Progress noted in that he was able to reach out for help  Barriers continue to be intensity of his fears related to COVID  Reviewed treatment plan and copy provided  He did not start the Wellbutrin increase because he is afraid to go the pharmacy - he is asking his daughter to do it for him today      Medications changes/added/denied? No    Treatment session number: 1    Individual Case Management Visit provided today?  Yes     [facility] follow up physician's orders: na

## 2021-01-13 NOTE — PSYCH
This note was not shared with the patient due to this is a psychotherapy note   Subjective:     Patient ID: Cande Mcginnis is a 54 y o  male  [facility] Clinical Progress Notes      Specialized Services Documentation  Therapist must complete separate progress note for each specific clinical activity in which the individual participated during the day  GROUP PSYCHOTHERAPY ([id number]) Group was facilitated virtually in a private office using HIPAA Compliant and Approved [employer] Teams  Cande Mcginnis consented to the use of tele-video modality of treatment and was virtually present for group psychotherapy today  The group members received a safe and non-judgmental space to discuss current stressors and received feedback from the group  The group concluded with members engaging in a deep breathing exercise  Charles Goss shared with prompting about his excitement about getting into the program  Charles Goss makes progress towards goals through verbal participation in group  Continue with psychotherapy     TX Plan Objectives: 1 1, 1 2, 1 4   Therapist: Delia Raman MA

## 2021-01-13 NOTE — PSYCH
This note was not shared with the patient due to this is a psychotherapy note  Subjective:     Patient ID: Angus Ribeiro is a 54 y o  male  [facility] Clinical Progress Notes      Specialized Services Documentation  Therapist must complete separate progress note for each specific clinical activity in which the individual participated during the day  Group Psychotherapy Life Skills Group (10:25-11:10) Ryley Giles actively engaged in group focused on the cost of avoiding their problems which was facilitated virtually in a private office using HIPAA Compliant and Approved [employer] Teams  Ryley Giles consented to the use of tele-video modality of treatment and was virtually present for group psychotherapy today  The group discussed their thoughts, feelings, sensations, and memories that upset them  During the activity Ryley Giles talked about COVID upsetting him and his fear of giving it to his wife  The group discussed what they do to avoid their problems and the effects it has on them  Ryley Giles stated that he does not go anywhere and only leaves his house to walk the dogs in order to avoid the upsetting feeling  Ryley Giles expressed that his life would be back to normal if he stopped avoiding the upsetting feeling and accepted the situation  Ryley Giles continues to make progress towards goals through verbal participation in group; to accomplish long term goals continue to utilize skills learned in programming  Continue with psychotherapy to educate and encourage use of wellness tools  Tx Plan Objective: 1 1,1 2 Therapist:  TIM Nina      Education Therapy   [id number] Angus Ribeiro was not present for morning assessment  Teresa engaged throughout the treatment day  Was engaged in learning related to Illness, Medication, Aftercare and Wellness Tools  Staff utilized Verbal, Written, A/V and Demonstration teaching methods    Angus Ribeiro shared area of learning and set a goal for outside of program to call and schedule his Covid Vaccine        Tx Plan Objective: 1 1,1 2 Therapist:  TIM Dia

## 2021-01-13 NOTE — PSYCH
Virtual Regular Visit      Assessment/Plan:    Problem List Items Addressed This Visit        Other    Major depressive disorder, recurrent severe without psychotic features ([facility] [address] ) - Primary               Reason for visit is VIRTUAL PHP DUE TO COVID-19       Encounter provider BE [address]    Provider located at [address] [id number]      Recent Visits  Date Type Provider Dept   01/12/21 Office Visit [facility]   Showing recent visits within past 7 days and meeting all other requirements     Future Appointments  No visits were found meeting these conditions  Showing future appointments within next 150 days and meeting all other requirements        The patient was identified by name and date of birth  Donnell Vieyra was informed that this is a telemedicine visit and that the visit is being conducted through [employer] and patient was informed that this is a secure, HIPAA-compliant platform  He agrees to proceed     My office door was closed  No one else was in the room  He acknowledged consent and understanding of privacy and security of the video platform  The patient has agreed to participate and understands they can discontinue the visit at any time  Patient is aware this is a billable service  Subjective  Donnell Vieyra is a 54 y o  male          HPI     Past Medical History:   Diagnosis Date    Anxiety     Depression     Diabetes mellitus ([facility] [address] )     Hypertension        Past Surgical History:   Procedure Laterality Date    BACK SURGERY         Current Outpatient Medications   Medication Sig Dispense Refill    amLODIPine (NORVASC) 5 mg tablet Take 5 mg by mouth daily      atorvastatin (LIPITOR) 40 mg tablet Take 40 mg by mouth daily      buPROPion (WELLBUTRIN XL) 300 mg 24 hr tablet Take 1 tablet (300 mg total) by mouth daily 30 tablet 0    DULoxetine (CYMBALTA) 60 mg delayed release capsule Take 60 mg by mouth daily      gabapentin (NEURONTIN) 600 MG tablet Take 600 mg by mouth 3 (three) times a day      hydrochlorothiazide (HYDRODIURIL) 25 mg tablet Take 25 mg by mouth daily      hydrOXYzine HCL (ATARAX) 50 mg tablet Take 50 mg by mouth daily as needed for anxiety Half to 1 tablet daily p r n  For severe anxiety      insulin lispro (HumaLOG) 100 units/mL by Subcutaneous Insulin Pump route as needed      lisinopril (ZESTRIL) 40 mg tablet Take 40 mg by mouth daily      metFORMIN (GLUCOPHAGE) 1000 MG tablet Take 1,000 mg by mouth 2 (two) times a day with meals      metoprolol succinate (TOPROL-XL) 50 mg 24 hr tablet Take 25 mg by mouth daily      Multiple Vitamin (MULTIVITAMIN) tablet Take 1 tablet by mouth daily      tamsulosin (FLOMAX) 0 4 mg Take 0 4 mg by mouth daily with dinner       No current facility-administered medications for this visit  Allergies   Allergen Reactions    Morphine GI Intolerance    Tetanus Toxoid Swelling    Tetanus-Diphth-Acell Pertussis [Tetanus-Diphth-Acell Pertussis] GI Intolerance       Review of Systems    Video Exam    There were no vitals filed for this visit  Physical Exam     I spent 4 hours of group + case management minutes with patient today in which greater than 50% of the time was spent in counseling/coordination of care regarding see notes  VIRTUAL VISIT DISCLAIMER    Ramila Bryson acknowledges that he has consented to an online visit or consultation  He understands that the online visit is based solely on information provided by him, and that, in the absence of a face-to-face physical evaluation by the physician, the diagnosis he receives is both limited and provisional in terms of accuracy and completeness  This is not intended to replace a full medical face-to-face evaluation by the physician  Ramila Bryson understands and accepts these terms

## 2021-01-14 ENCOUNTER — OFFICE VISIT (OUTPATIENT)
Dept: PSYCHOLOGY | Facility: CLINIC | Age: 56
End: 2021-01-14
Payer: COMMERCIAL

## 2021-01-14 DIAGNOSIS — F33.2 MAJOR DEPRESSIVE DISORDER, RECURRENT SEVERE WITHOUT PSYCHOTIC FEATURES (HCC): Primary | ICD-10-CM

## 2021-01-14 PROCEDURE — S0201 PARTIAL HOSPITALIZATION SERV: HCPCS

## 2021-01-14 PROCEDURE — G0176 OPPS/PHP;ACTIVITY THERAPY: HCPCS

## 2021-01-14 PROCEDURE — G0177 OPPS/PHP; TRAIN & EDUC SERV: HCPCS

## 2021-01-14 PROCEDURE — G0410 GRP PSYCH PARTIAL HOSP 45-50: HCPCS

## 2021-01-14 NOTE — PSYCH
This note was not shared with the patient due to this is a psychotherapy note  Subjective:     Patient ID: Karley Colindres is a 54 y o  male  [facility] Clinical Progress Notes      Specialized Services Documentation  Therapist must complete separate progress note for each specific clinical activity in which the individual participated during the day  Group Psychotherapy Life Skills Group (10:25-11:10) Obey Aiken actively engaged in group focused on cognitive distortions and restructuring their cognitive distortions which was facilitated virtually in a private office using HIPAA Compliant and Approved [employer] Teams  Obey Aiken consented to the use of tele-video modality of treatment and was virtually present for group psychotherapy today  The group learned about cognitive distortions, gave examples and learned how to challenge their distortions  During the activity Michael's cognitive distortion revolved around his thought of previous suicide ideation due to Delores Laud and calling himself names   Yosvanyshazia Valentinramakrishna identified that this type of error in thinking is mental filtering and labeling  A rational alternative that he gave was " I am in program and realized my situation is not good  I am seeking help  " Obey Homeroramakrishna continues to make progress towards goals through verbal participation in group; to accomplish long term goals continue to utilize skills learned in programming  Continue with psychotherapy to educate and encourage use of wellness tools  Tx Plan Objective: 1 1,1 2 Therapist:  TIM Flood

## 2021-01-14 NOTE — PSYCH
This note was not shared with the patient due to this is a psychotherapy note    Subjective:     Patient ID: Neeraj Turk is a 54 y o  male  [facility] Clinical Progress Notes      Specialized Services Documentation  Therapist must complete separate progress note for each specific clinical activity in which the individual participated during the day  Education Therapy   [id number] Neeraj Turk actively shared in morning assessment and goal review  Presented as Receptive related to readiness to learn  Neeraj Turk did complete goal from last treatment day identifying gaining  emotional wellness  did not present with any barriers to learning  Teresa engaged throughout the treatment day  Was engaged in learning related to [employer]  Staff utilized Verbal, Written, A/V, and Demonstration teaching methods  Neeraj Turk shared area of learning and set a goal for outside of program to go to the stove store        Tx Plan Objective: 1 4, Therapist:  Celina Collado RN

## 2021-01-14 NOTE — PSYCH
Virtual Regular Visit      Assessment/Plan:    Problem List Items Addressed This Visit        Other    Major depressive disorder, recurrent severe without psychotic features ([facility] [address] ) - Primary               Reason for visit is VIRTUAL PHP DUE TO COVID-19       Encounter provider BE [address]    Provider located at [place] [id number] [place] [id number]      Recent Visits  Date Type Provider Dept   01/13/21 Office Visit [facility]   01/12/21 Office Visit [facility]   Showing recent visits within past 7 days and meeting all other requirements     Today's Visits  Date Type Provider Dept   01/14/21 Office Visit [facility]   Showing today's visits and meeting all other requirements     Future Appointments  No visits were found meeting these conditions  Showing future appointments within next 150 days and meeting all other requirements        The patient was identified by name and date of birth  Jada Putnam was informed that this is a telemedicine visit and that the visit is being conducted through [employer] and patient was informed that this is a secure, HIPAA-compliant platform  He agrees to proceed     My office door was closed  No one else was in the room  He acknowledged consent and understanding of privacy and security of the video platform  The patient has agreed to participate and understands they can discontinue the visit at any time  Patient is aware this is a billable service  Subjective  Jada Putnam is a 54 y o  male          HPI     Past Medical History:   Diagnosis Date    Anxiety     Depression     Diabetes mellitus ([facility] )     Hypertension        Past Surgical History:   Procedure Laterality Date    BACK SURGERY         Current Outpatient Medications   Medication Sig Dispense Refill    amLODIPine (NORVASC) 5 mg tablet Take 5 mg by mouth daily      atorvastatin (LIPITOR) 40 mg tablet Take 40 mg by mouth daily      buPROPion (WELLBUTRIN XL) 300 mg 24 hr tablet Take 1 tablet (300 mg total) by mouth daily 30 tablet 0    DULoxetine (CYMBALTA) 60 mg delayed release capsule Take 60 mg by mouth daily      gabapentin (NEURONTIN) 600 MG tablet Take 600 mg by mouth 3 (three) times a day      hydrochlorothiazide (HYDRODIURIL) 25 mg tablet Take 25 mg by mouth daily      hydrOXYzine HCL (ATARAX) 50 mg tablet Take 50 mg by mouth daily as needed for anxiety Half to 1 tablet daily p r n  For severe anxiety      insulin lispro (HumaLOG) 100 units/mL by Subcutaneous Insulin Pump route as needed      lisinopril (ZESTRIL) 40 mg tablet Take 40 mg by mouth daily      metFORMIN (GLUCOPHAGE) 1000 MG tablet Take 1,000 mg by mouth 2 (two) times a day with meals      metoprolol succinate (TOPROL-XL) 50 mg 24 hr tablet Take 25 mg by mouth daily      Multiple Vitamin (MULTIVITAMIN) tablet Take 1 tablet by mouth daily      tamsulosin (FLOMAX) 0 4 mg Take 0 4 mg by mouth daily with dinner       No current facility-administered medications for this visit  Allergies   Allergen Reactions    Morphine GI Intolerance    Tetanus Toxoid Swelling    Tetanus-Diphth-Acell Pertussis [Tetanus-Diphth-Acell Pertussis] GI Intolerance       Review of Systems    Video Exam    There were no vitals filed for this visit  Physical Exam     I spent 4 hours group + case management minutes with patient today in which greater than 50% of the time was spent in counseling/coordination of care regarding see notes  VIRTUAL VISIT DISCLAIMER    Yaronelydanita Méndez acknowledges that he has consented to an online visit or consultation   He understands that the online visit is based solely on information provided by him, and that, in the absence of a face-to-face physical evaluation by the physician, the diagnosis he receives is both limited and provisional in terms of accuracy and completeness  This is not intended to replace a full medical face-to-face evaluation by the physician  Karin Kinsey understands and accepts these terms

## 2021-01-14 NOTE — PSYCH
This note was not shared with the patient due to this is a psychotherapy note  Subjective:     Patient ID: Jacey Goldman is a 54 y o  male  [facility] Clinical Progress Notes      Specialized Services Documentation  Therapist must complete separate progress note for each specific clinical activity in which the individual participated during the day  Group Psychotherapy ([id number])   This group was facilitated virtually in a private office using [employer] and Approved [employer] Teams  Jacey Goldman consented to the use of tele-video modality of treatment  Jacey Goldman shared, when prompted, in psychotherapy group today which focused on the Weekly Wellness Assessment  He engaged in self-rate and discussion  Group members individually went through the assessment and rated themselves based on the past week  Group members shared assessment results  Group discussed the six domains of wellness; physical, emotional, cognitive, vocational, social, and spiritual  Group discussed strengths, challenges, barriers, and ways to increase each domain  Memo Bey chose the emotional domain and identified the need to leave his house more often  He plans to challenge himself by running errands and 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

## 2021-01-14 NOTE — PSYCH
This note was not shared with the patient due to this is a psychotherapy note       Subjective:     Patient ID: Neeraj Turk is a 54 y o  male  [facility] Clinical Progress Notes      Specialized Services Documentation  Therapist must complete separate progress note for each specific clinical activity in which the individual participated during the day  Allied Therapy   This group was facilitated virtually in a private office using [employer] and Approved [employer] Teams  Neeraj Turk consented to the use of tele-video modality of treatment  [address]  actively shared in [facility] group focused on distress tolerance skill self-soothe  Severo Munson was observed to be engaged in therapist led relaxation exercises  Group discussed specific items that could help self soothe if in an anxiety crisis with encouragement to use these things regularly to manage stressors consistently  He identified he would put coffee  in  a self soothe kit  Some effort noted toward tx goal   Continue AT to encourage development of wellness skills and consistent practice     Tx Plan Objective: 1 1, Therapist:  Cheri JERNIGAN        Case Management Note    Cheri JERNIGAN    Current suicide risk : Low     [id number] Met with Neeraj Turk  Reported that he had a good day and take aways from today's session  Progress noted in that he knows he needs to start to get out and is going to go to a [employer]  to help tomorrow to at least show support for a little  Barriers continue to be COVID fears  He also scheduled his vaccine  Medications changes/added/denied? Yes     Treatment session number: 2    Individual Case Management Visit provided today?  Yes     [facility] follow up physician's orders: na

## 2021-01-14 NOTE — PSYCH
This note was not shared with the patient due to this is a psychotherapy note   Subjective:     Patient ID: Caitlin Bowen is a 54 y o  male  [facility] Clinical Progress Notes      Specialized Services Documentation  Therapist must complete separate progress note for each specific clinical activity in which the individual participated during the day  GROUP PSYCHOTHERAPY  ([id number]) Group was facilitated virtually in a private office using HIPAA Compliant and Approved [employer] Teams  Caitlin Bowen consented to the use of tele-video modality of treatment and was virtually present for group psychotherapy today  Luis Avitia attentively listened to [facility] share his life story as he co-led this session  Group encouraged power of learning about self, accepting illness and personal responsibility in recovery  Community resources reviewed in addition to personal resources like the affirmations  Progress toward goals noted  Continue psychotherapy to encourage self -awareness and healthy engagement of supports      TX Plan Objectives: 1 1, 1 2, 1 4   Therapist: Alice Mendoza MA

## 2021-01-15 ENCOUNTER — OFFICE VISIT (OUTPATIENT)
Dept: PSYCHOLOGY | Facility: CLINIC | Age: 56
End: 2021-01-15
Payer: COMMERCIAL

## 2021-01-15 DIAGNOSIS — F33.2 MAJOR DEPRESSIVE DISORDER, RECURRENT SEVERE WITHOUT PSYCHOTIC FEATURES (HCC): Primary | ICD-10-CM

## 2021-01-15 PROCEDURE — G0176 OPPS/PHP;ACTIVITY THERAPY: HCPCS

## 2021-01-15 PROCEDURE — S0201 PARTIAL HOSPITALIZATION SERV: HCPCS

## 2021-01-15 PROCEDURE — G0410 GRP PSYCH PARTIAL HOSP 45-50: HCPCS

## 2021-01-15 PROCEDURE — G0177 OPPS/PHP; TRAIN & EDUC SERV: HCPCS

## 2021-01-15 NOTE — PSYCH
This note was not shared with the patient due to this is a psychotherapy note   Subjective:     Patient ID: Cristela Chang is a 54 y o  male  [facility] Clinical Progress Notes      Specialized Services Documentation  Therapist must complete separate progress note for each specific clinical activity in which the individual participated during the day  Education Therapy   [id number] Cristela Chang actively shared in morning assessment and goal review  Presented as Receptive related to readiness to learn  Cristela Chang did complete goal from last treatment day identifying gaining responsibility  did not present with any barriers to learning  Teresa engaged throughout the treatment day  Was engaged in learning related to Illness and Wellness Tools  Staff utilized Verbal, Written and A/V teaching methods  Cristela Chang shared area of learning and set a goal for outside of program to try to work at [facility] Plan Objective: 1 2, Therapist:  Sheron JERNIGAN    Case Management Note    Sheron JERNIGAN    Current suicide risk : Low     This case management session was facilitated virtually in a private office using [employer] and Approved [employer] Teams  Cristela Bernardo consented to the use of tele-video modality of treatment  1230 - 1250 Met with Cristela Chang to discuss weekend plans and supports  Cristelakarrie Chang identified he is working on building confidence in order to get to [facility] to assist with the  this evening and tomorrow  Reviewed additional weekend plans and learning from today  Goals for next week include medication review (date reviewed), work on treatment objectives, and explore family meeting  Medications changes/added/denied? No    Treatment session number: 3    Individual Case Management Visit provided today?  Yes     [facility] follow up physician's orders: na

## 2021-01-15 NOTE — PSYCH
This note was not shared with the patient due to this is a psychotherapy note  Subjective:     Patient ID: Daphnie Limon is a 54 y o  male  [facility] Clinical Progress Notes      Specialized Services Documentation  Therapist must complete separate progress note for each specific clinical activity in which the individual participated during the day  Group Psychotherapy Life Skills Group (10:25-11:10) Molly Perera actively engaged in group focused on positive affirmations which was facilitated virtually in a private office using HIPAA Compliant and Approved [employer] Teams  Molly Perera consented to the use of tele-video modality of treatment and was virtually present for group psychotherapy today  The group focused on acknowledging and accepting positive qualities in front of peers  During the activity group members stated 3 positive affirmations of themselves  One of the positive affirmations that Molly Perera stated is  I look good when I dress nice   Group members processed the activity by discussing their experience when doing this exercise  Molly Perera stated that he could use personal affirmations by saying them when he wake up in the morning   Molly Perera continues to make progress towards goals through verbal participation in group; to accomplish long term goals continue to utilize skills learned in programming  Continue with psychotherapy to educate and encourage use of wellness tools  Tx Plan Objective: 1 1,1 2 Therapist:  TIM Stringer

## 2021-01-15 NOTE — PSYCH
Virtual Regular Visit      Assessment/Plan:    Problem List Items Addressed This Visit        Other    Major depressive disorder, recurrent severe without psychotic features ([facility] [address] ) - Primary               Reason for visit is VIRTUAL PHP DUE TO COVID-19       Encounter provider [facility] [address] [facility] [address]    Provider located at [address] [address]      Recent Visits  Date Type Provider Dept   01/14/21 Office Visit [facility]   01/13/21 Office Visit [facility]   01/12/21 Office Visit [facility]   Showing recent visits within past 7 days and meeting all other requirements     Today's Visits  Date Type Provider Dept   01/15/21 Office Visit [facility]   Showing today's visits and meeting all other requirements     Future Appointments  No visits were found meeting these conditions  Showing future appointments within next 150 days and meeting all other requirements        The patient was identified by name and date of birth  Dedra Carrera was informed that this is a telemedicine visit and that the visit is being conducted through [employer] and patient was informed that this is a secure, HIPAA-compliant platform  He agrees to proceed     My office door was closed  No one else was in the room  He acknowledged consent and understanding of privacy and security of the video platform  The patient has agreed to participate and understands they can discontinue the visit at any time  Patient is aware this is a billable service  Subjective  Dedra Carrera is a 54 y o  male             HPI     Past Medical History:   Diagnosis Date    Anxiety     Depression     Diabetes mellitus ([facility] )     Hypertension        Past Surgical History:   Procedure Laterality Date    BACK SURGERY         Current Outpatient Medications   Medication Sig Dispense Refill    amLODIPine (NORVASC) 5 mg tablet Take 5 mg by mouth daily      atorvastatin (LIPITOR) 40 mg tablet Take 40 mg by mouth daily      buPROPion (WELLBUTRIN XL) 300 mg 24 hr tablet Take 1 tablet (300 mg total) by mouth daily 30 tablet 0    DULoxetine (CYMBALTA) 60 mg delayed release capsule Take 60 mg by mouth daily      gabapentin (NEURONTIN) 600 MG tablet Take 600 mg by mouth 3 (three) times a day      hydrochlorothiazide (HYDRODIURIL) 25 mg tablet Take 25 mg by mouth daily      hydrOXYzine HCL (ATARAX) 50 mg tablet Take 50 mg by mouth daily as needed for anxiety Half to 1 tablet daily p r n  For severe anxiety      insulin lispro (HumaLOG) 100 units/mL by Subcutaneous Insulin Pump route as needed      lisinopril (ZESTRIL) 40 mg tablet Take 40 mg by mouth daily      metFORMIN (GLUCOPHAGE) 1000 MG tablet Take 1,000 mg by mouth 2 (two) times a day with meals      metoprolol succinate (TOPROL-XL) 50 mg 24 hr tablet Take 25 mg by mouth daily      Multiple Vitamin (MULTIVITAMIN) tablet Take 1 tablet by mouth daily      tamsulosin (FLOMAX) 0 4 mg Take 0 4 mg by mouth daily with dinner       No current facility-administered medications for this visit  Allergies   Allergen Reactions    Morphine GI Intolerance    Tetanus Toxoid Swelling    Tetanus-Diphth-Acell Pertussis [Tetanus-Diphth-Acell Pertussis] GI Intolerance       Review of Systems    Video Exam    There were no vitals filed for this visit  Physical Exam     I spent 4 hours of group + case management minutes with patient today in which greater than 50% of the time was spent in counseling/coordination of care regarding see notes  VIRTUAL VISIT DISCLAIMER    Rowena Zabala acknowledges that he has consented to an online visit or consultation   He understands that the online visit is based solely on information provided by him, and that, in the absence of a face-to-face physical evaluation by the physician, the diagnosis he receives is both limited and provisional in terms of accuracy and completeness  This is not intended to replace a full medical face-to-face evaluation by the physician  Mercedes Mallory understands and accepts these terms

## 2021-01-15 NOTE — PSYCH
This note was not shared with the patient due to this is a psychotherapy note   Subjective:     Patient ID: Abigail Tyler is a 54 y o  male  [facility] Clinical Progress Notes      Specialized Services Documentation  Therapist must complete separate progress note for each specific clinical activity in which the individual participated during the day  GROUP PSYCHOTHERAPY ([id number]) Group was facilitated virtually in a private office using HIPAA Compliant and Approved [employer] Teams  Abigail Tyler consented to the use of tele-video modality of treatment and was virtually present for group psychotherapy today  The group was offered process time to discuss current stressors and questions about mental health  Group topics included positivity and self disclosure  Kevin Paul shared that she tends to become overloaded, especially with emails  He has been able to organize the emails to revisit them later  The connection was made to find was to organize and file other thoughts, in order to address them with a clear mind  Kevin Miller continues to make progress towards goals through verbal participation in group  Continue with psychotherapy     TX Plan Objectives: 1 1, 1 2, 1 4   Therapist: Chato Mccabe MA

## 2021-01-18 ENCOUNTER — OFFICE VISIT (OUTPATIENT)
Dept: PSYCHOLOGY | Facility: CLINIC | Age: 56
End: 2021-01-18
Payer: COMMERCIAL

## 2021-01-18 ENCOUNTER — TELEMEDICINE (OUTPATIENT)
Dept: PSYCHIATRY | Facility: CLINIC | Age: 56
End: 2021-01-18
Payer: COMMERCIAL

## 2021-01-18 DIAGNOSIS — F33.2 MAJOR DEPRESSIVE DISORDER, RECURRENT SEVERE WITHOUT PSYCHOTIC FEATURES (HCC): Primary | ICD-10-CM

## 2021-01-18 PROCEDURE — 99213 OFFICE O/P EST LOW 20 MIN: CPT | Performed by: NURSE PRACTITIONER

## 2021-01-18 PROCEDURE — S0201 PARTIAL HOSPITALIZATION SERV: HCPCS

## 2021-01-18 PROCEDURE — G0177 OPPS/PHP; TRAIN & EDUC SERV: HCPCS

## 2021-01-18 PROCEDURE — G0176 OPPS/PHP;ACTIVITY THERAPY: HCPCS

## 2021-01-18 PROCEDURE — G0410 GRP PSYCH PARTIAL HOSP 45-50: HCPCS

## 2021-01-18 NOTE — PSYCH
Virtual Regular Visit    Problem List Items Addressed This Visit        Other    Major depressive disorder, recurrent severe without psychotic features ([facility] [address] ) - Primary             Encounter provider NHAN Olivas    Provider located at   [address] [place] [address] [address]  [facility] [address]    Recent Visits  Date Type Provider Dept   01/12/21 Office Visit MD Mark Leiva recent visits within past 7 days and meeting all other requirements     Future Appointments  No visits were found meeting these conditions  Showing future appointments within next 150 days and meeting all other requirements      The patient was identified by name and date of birth  Heike Dubose was informed that this is a telemedicine visit and that the visit is being conducted through [employer]  My office door was closed  No one else was in the room  He acknowledged consent and understanding of privacy and security of the video platform  The patient has agreed to participate and understands they can discontinue the visit at any time  Patient is aware this is a billable service  HPI     Current Outpatient Medications   Medication Sig Dispense Refill    amLODIPine (NORVASC) 5 mg tablet Take 5 mg by mouth daily      atorvastatin (LIPITOR) 40 mg tablet Take 40 mg by mouth daily      buPROPion (WELLBUTRIN XL) 300 mg 24 hr tablet Take 1 tablet (300 mg total) by mouth daily 30 tablet 0    DULoxetine (CYMBALTA) 60 mg delayed release capsule Take 60 mg by mouth daily      gabapentin (NEURONTIN) 600 MG tablet Take 600 mg by mouth 3 (three) times a day      hydrochlorothiazide (HYDRODIURIL) 25 mg tablet Take 25 mg by mouth daily      hydrOXYzine HCL (ATARAX) 50 mg tablet Take 50 mg by mouth daily as needed for anxiety Half to 1 tablet daily p r n   For severe anxiety      insulin lispro (HumaLOG) 100 units/mL by Subcutaneous Insulin Pump route as needed      lisinopril (ZESTRIL) 40 mg tablet Take 40 mg by mouth daily      metFORMIN (GLUCOPHAGE) 1000 MG tablet Take 1,000 mg by mouth 2 (two) times a day with meals      metoprolol succinate (TOPROL-XL) 50 mg 24 hr tablet Take 25 mg by mouth daily      Multiple Vitamin (MULTIVITAMIN) tablet Take 1 tablet by mouth daily      tamsulosin (FLOMAX) 0 4 mg Take 0 4 mg by mouth daily with dinner       No current facility-administered medications for this visit  Review of Systems  Video Exam    There were no vitals filed for this visit  Physical Exam   As a result of this visit, I have referred the patient for further respiratory evaluation  No    I spent 10 minutes directly with the patient during this visit  VIRTUAL VISIT DISCLAIMER    Denzel Riedel acknowledges that he has consented to an online visit or consultation  He understands that the online visit is based solely on information provided by him, and that, in the absence of a face-to-face physical evaluation by the physician, the diagnosis he receives is both limited and provisional in terms of accuracy and completeness  This is not intended to replace a full medical face-to-face evaluation by the physician  Denzel Riedel understands and accepts these terms  PHP MEDICATION MANAGEMENT NOTE        [facility]    Name and Date of Birth:  Denzel Riedel 54 y o  1965 MRN: [id number]    Date of Visit: January 18, 2021    Allergies   Allergen Reactions    Morphine GI Intolerance    Tetanus Toxoid Swelling    Tetanus-Diphth-Acell Pertussis [Tetanus-Diphth-Acell Pertussis] GI Intolerance     SUBJECTIVE:    Judi Cantu is seen today for a follow up for depression and anxiety  He reports that he has improved since beginning PHP  Patient states that Wellbutrin increase has been helpful with depression    Patient continues to have anxiety around others but has found addition hydroxyzine as needed a helpful coping tool  He denies any side effects from medications  PLAN:  Continue Wellbutrin  mg p o  Daily  Continue Cymbalta 60 mg p o  Daily  Continue as needed hydroxyzine anxiety  Aware of 24 hour and weekend coverage for urgent situations accessed by calling [facility] main practice number  Continue partial hospitalization program    Diagnoses and all orders for this visit:    Major depressive disorder, recurrent severe without psychotic features ([facility] [address] )        Current Outpatient Medications on File Prior to Visit   Medication Sig Dispense Refill    amLODIPine (NORVASC) 5 mg tablet Take 5 mg by mouth daily      atorvastatin (LIPITOR) 40 mg tablet Take 40 mg by mouth daily      buPROPion (WELLBUTRIN XL) 300 mg 24 hr tablet Take 1 tablet (300 mg total) by mouth daily 30 tablet 0    DULoxetine (CYMBALTA) 60 mg delayed release capsule Take 60 mg by mouth daily      gabapentin (NEURONTIN) 600 MG tablet Take 600 mg by mouth 3 (three) times a day      hydrochlorothiazide (HYDRODIURIL) 25 mg tablet Take 25 mg by mouth daily      hydrOXYzine HCL (ATARAX) 50 mg tablet Take 50 mg by mouth daily as needed for anxiety Half to 1 tablet daily p r n  For severe anxiety      insulin lispro (HumaLOG) 100 units/mL by Subcutaneous Insulin Pump route as needed      lisinopril (ZESTRIL) 40 mg tablet Take 40 mg by mouth daily      metFORMIN (GLUCOPHAGE) 1000 MG tablet Take 1,000 mg by mouth 2 (two) times a day with meals      metoprolol succinate (TOPROL-XL) 50 mg 24 hr tablet Take 25 mg by mouth daily      Multiple Vitamin (MULTIVITAMIN) tablet Take 1 tablet by mouth daily      tamsulosin (FLOMAX) 0 4 mg Take 0 4 mg by mouth daily with dinner       No current facility-administered medications on file prior to visit          Psychotherapy Provided:     Individual psychotherapy provided: No    HPI ROS Appetite Changes and Sleep:     He reports difficulty falling asleep, adequate appetite, decreased energy   Patient denies suicidal or homicidal ideation    Review Of Systems:      General emotional problems and decreased functioning   Personality no change in personality   Constitutional negative   ENT negative   Cardiovascular negative   Respiratory negative   Gastrointestinal negative   Genitourinary negative   Musculoskeletal negative   Integumentary negative   Neurological negative   Endocrine negative   Other Symptoms none, all other systems are negative     Mental Status Evaluation:    Appearance Adequate hygiene and grooming   Behavior cooperative   Mood anxious  Depression Scale -  of 10 (0 = No depression)  Anxiety Scale -  of 10 (0 = No anxiety)   Speech Normal rate and volume   Affect appropriate and mood-congruent   Thought Processes Goal directed and coherent   Thought Content Does not verbalize delusional material   Associations Tightly connected   Perceptual Disturbances Denies hallucinations and does not appear to be responding to internal stimuli   Risk Potential Suicidal/Homicidal Ideation - No evidence of suicidal or homicidal ideation and Patient does not verbalize suicidal or homicidal ideation  Risk of Violence - No evidence of risk for violence found on assessment  Risk of Self Mutilation - No evidence of risk for self mutilation found on assessment   Orientation oriented to person, place, time/date and situation   Memory recent and remote memory grossly intact   Consciousness alert and awake   Attention/Concentration attention span and concentration are age appropriate   Insight fair   Judgement fair   Muscle Strength and Gait normal muscle strength and normal muscle tone, normal gait/station and normal balance   Motor Activity no abnormal movements   Language no difficulty naming common objects, no difficulty repeating a phrase, no difficulty writing a sentence   Fund of Knowledge adequate knowledge of current events  adequate fund of knowledge regarding past history  adequate fund of knowledge regarding vocabulary      Past Psychiatric History Update:     Inpatient Psychiatric Admission Since Last Encounter:   no  Suicide Attempt Or Self Mutilation Since Last Encounter:   no  Incidence of Violent Behavior Since Last Encounter:   no    Traumatic History Update:     New Onset of Abuse Since Last Encounter:   no  Traumatic Events Since Last Encounter:   no    Past Medical History:    Past Medical History:   Diagnosis Date    Anxiety     Depression     Diabetes mellitus ([place] [address] )     Hypertension      Past Medical History Pertinent Negatives:   Diagnosis Date Noted    Head injury 05/13/2020    Seizures ([facility] ) 05/13/2020     Past Surgical History:   Procedure Laterality Date    BACK SURGERY       Allergies   Allergen Reactions    Morphine GI Intolerance    Tetanus Toxoid Swelling    Tetanus-Diphth-Acell Pertussis [Tetanus-Diphth-Acell Pertussis] GI Intolerance     Substance Abuse History:    Social History     Substance and Sexual Activity   Alcohol Use Yes    Comment: occasional     Social History     Substance and Sexual Activity   Drug Use No     Social History:    Social History     Socioeconomic History    Marital status: /Civil Union     Spouse name: Not on file    Number of children: 2    Years of education: Not on file    Highest education level: Associate degree: occupational, technical, or vocational program   Occupational History     Employer: [employer]   Social Needs    Financial resource strain: Not on file    Food insecurity     Worry: Not on file     Inability: Not on file   [employer] needs     Medical: Not on file     Non-medical: Not on file   Tobacco Use    Smoking status: Current Some Day Smoker    Smokeless tobacco: Never Used   Substance and Sexual Activity    Alcohol use: Yes     Comment: occasional    Drug use: No    Sexual activity: Not on file   Lifestyle    Physical activity     Days per week: 7 days     Minutes per session: 30 min    Stress: Rather much   Relationships    Social connections     Talks on phone: Not on file     Gets together: Not on file     Attends [employer] service: Not on file     Active member of club or organization: Not on file     Attends meetings of clubs or organizations: Not on file     Relationship status: Not on file    Intimate partner violence     Fear of current or ex partner: No     Emotionally abused: No     Physically abused: Not on file     Forced sexual activity: No   Other Topics Concern    Not on file   Social History Narrative    Not on file     Family Psychiatric History:     Family History   Problem Relation Age of Onset    Stroke Mother     Psychiatric Illness Neg Hx      History Review: The following portions of the patient's history were reviewed and updated as appropriate: allergies, current medications, past family history, past medical history, past social history, past surgical history and problem list     OBJECTIVE:     Vital signs in last 24 hours: There were no vitals filed for this visit  Laboratory Results: I have personally reviewed all pertinent laboratory/tests results  Medications Risks/Benefits:      Risks, Benefits And Possible Side Effects Of Medications:    Discussed risks and benefits of treatment with patient including risk of suicidality, serotonin syndrome and SIADH related to treatment with antidepressants; Risk of induction of manic symptoms in certain patient populations     Controlled Medication Discussion:     Not applicable    NHAN Ortiz 01/18/21    This note was shared with patient

## 2021-01-18 NOTE — PSYCH
Virtual Regular Visit      Assessment/Plan:    Problem List Items Addressed This Visit        Other    Major depressive disorder, recurrent severe without psychotic features ([facility] [address] ) - Primary               Reason for visit is VIRTUAL PHP DUE TO COVID-19       [address] [facility] [address]    Provider located at [address] [place] [id number]      Recent Visits  Date Type Provider Dept   01/15/21 Office Visit [facility]   01/14/21 Office Visit [facility]   01/13/21 [facility] Visit [facility]   01/12/21 [facility] Visit [facility]   Showing recent visits within past 7 days and meeting all other requirements     Future Appointments  No visits were found meeting these conditions  Showing future appointments within next 150 days and meeting all other requirements        The patient was identified by name and date of birth  Sancho Bazan was informed that this is a telemedicine visit and that the visit is being conducted through [employer] and patient was informed that this is a secure, HIPAA-compliant platform  He agrees to proceed     My office door was closed  No one else was in the room  He acknowledged consent and understanding of privacy and security of the video platform  The patient has agreed to participate and understands they can discontinue the visit at any time  Patient is aware this is a billable service  Subjective  Sancho Bazan is a 54 y o  male          HPI     Past Medical History:   Diagnosis Date    Anxiety     Depression     Diabetes mellitus ([facility] [address] )     Hypertension        Past Surgical History:   Procedure Laterality Date    BACK SURGERY         Current Outpatient Medications   Medication Sig Dispense Refill    amLODIPine (NORVASC) 5 mg tablet Take 5 mg by mouth daily      atorvastatin (LIPITOR) 40 mg tablet Take 40 mg by mouth daily      buPROPion (WELLBUTRIN XL) 300 mg 24 hr tablet Take 1 tablet (300 mg total) by mouth daily 30 tablet 0    DULoxetine (CYMBALTA) 60 mg delayed release capsule Take 60 mg by mouth daily      gabapentin (NEURONTIN) 600 MG tablet Take 600 mg by mouth 3 (three) times a day      hydrochlorothiazide (HYDRODIURIL) 25 mg tablet Take 25 mg by mouth daily      hydrOXYzine HCL (ATARAX) 50 mg tablet Take 50 mg by mouth daily as needed for anxiety Half to 1 tablet daily p r n  For severe anxiety      insulin lispro (HumaLOG) 100 units/mL by Subcutaneous Insulin Pump route as needed      lisinopril (ZESTRIL) 40 mg tablet Take 40 mg by mouth daily      metFORMIN (GLUCOPHAGE) 1000 MG tablet Take 1,000 mg by mouth 2 (two) times a day with meals      metoprolol succinate (TOPROL-XL) 50 mg 24 hr tablet Take 25 mg by mouth daily      Multiple Vitamin (MULTIVITAMIN) tablet Take 1 tablet by mouth daily      tamsulosin (FLOMAX) 0 4 mg Take 0 4 mg by mouth daily with dinner       No current facility-administered medications for this visit  Allergies   Allergen Reactions    Morphine GI Intolerance    Tetanus Toxoid Swelling    Tetanus-Diphth-Acell Pertussis [Tetanus-Diphth-Acell Pertussis] GI Intolerance       Review of Systems    Video Exam    There were no vitals filed for this visit  Physical Exam     I spent 4 hours of group + case management minutes with patient today in which greater than 50% of the time was spent in counseling/coordination of care regarding see notes  VIRTUAL VISIT DISCLAIMER    Dakota Taylor acknowledges that he has consented to an online visit or consultation  He understands that the online visit is based solely on information provided by him, and that, in the absence of a face-to-face physical evaluation by the physician, the diagnosis he receives is both limited and provisional in terms of accuracy and completeness   This is not intended to replace a full medical face-to-face evaluation by the physician  Angus Ribeiro understands and accepts these terms

## 2021-01-18 NOTE — PSYCH
This note was not shared with the patient due to this is a psychotherapy note      Subjective:     Patient ID: Denzel Riedel is a 54 y o  male  [facility] Clinical Progress Notes      Specialized Services Documentation  Therapist must complete separate progress note for each specific clinical activity in which the individual participated during the day  This group was facilitated virtually in a private office using [employer] and Approved Microsoft Teams  Denzel Riedel consented to the use of tele-video modality of treatment  Group Psychotherapy     ([id number]) Denzel Riedel was present in psychoeducational  group about depression  The group viewed the first part of an educational video about depression called, Depression: Out of the Shadows   Video educated the group on signs and symptoms of depression, medications, and therapies  The first half of the video depicted medication as well as non medication treatment  Video  discussed the positive outcomes for those who seek help  Video and discussion afterward offered group members a chance to further explore and become educated on their own diagnosis  Good progress towards goals through engagement and participation  Continue psychoeducational groups to educate on diagnosis  Tx Plan Objective:  1 4 Therapist:  Leeann Forrester RN      Subjective:     Patient ID: Denzel Riedel is a 54 y o  male  [facility] Clinical Progress Notes      Specialized Services Documentation  Therapist must complete separate progress note for each specific clinical activity in which the individual participated during the day  Education Therapy   [id number] Troy Haleedel with prompts shared in morning assessment and goal review  Presented as Receptive related to readiness to learn  Denzel Riedel did complete goal from last treatment day identifying gaining  emotional wellness  did not present with any barriers to learning       Teresa ruiz throughout the treatment day  Was engaged in learning related to [employer]  Staff utilized Verbal, Written, A/V, and Demonstration teaching methods  Caitlin Bowen shared area of learning and set a goal for outside of program to  care tires        Tx Plan Objective: 1 4, Therapist:  Ruby Lee RN

## 2021-01-18 NOTE — PSYCH
This note was not shared with the patient due to this is a psychotherapy note       Subjective:     Patient ID: George Sánchez is a [address] [place] y o  male  [facility] Clinical Progress Notes      Specialized Services Documentation  Therapist must complete separate progress note for each specific clinical activity in which the individual participated during the day  Allied Therapy   This group was facilitated virtually in a private office using [employer] and Approved [employer] Teams  Riyanoel Gonzalo consented to the use of tele-video modality of treatment  [id number] George Sánchez  actively shared in [facility] group focused on relaxation techniques and mindfulness strategies  Buzz Stephens engaged in PMR and breathing techniques  Group explored practice of what skills through observing, describing and participating in a morenita listening and then engaging in song  He identified he will listen to music to work on mindfulness tonight  Group discussed ways to apply to slowing down to make more effective choices  Some effort noted toward treatment goal   Continue AT to encourage the development and practice of mindfulness strategies to alleviate symptoms and support wellness  Tx Plan Objective: 1 1, Therapist:  Alondra JERNIGAN    Case Management Note    Alondra JERNIGAN    Current suicide risk : Low     This case management session was facilitated virtually in a private office using [employer] and Approved [employer] Teams  George Sánchez consented to the use of tele-video modality of treatment  [id number] Met with George Sánchez  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 No    Treatment session number: 4    Individual Case Management Visit provided today?  Yes [facility] follow up physician's orders: see S   Trever note

## 2021-01-18 NOTE — PSYCH
This note was not shared with the patient due to this is a psychotherapy note   Subjective:     Patient ID: Mercedes Mallory is a 54 y o  male  [facility] Clinical Progress Notes      Specialized Services Documentation  Therapist must complete separate progress note for each specific clinical activity in which the individual participated during the day  GROUP PSYCHOTHERAPY ([id number]) Group was facilitated virtually in a private office using HIPAA Compliant and Approved [employer] Teams  Mercedes Mallory consented to the use of tele-video modality of treatment and was virtually present for group psychotherapy today  The group was offered process time to discuss current stressors and questions about mental health  Group topics included personal disclosures and difficult situations from weekend and in the future  Reginaldo Bustos shared that he had a positive weekend  He was able to step outside his comfort zone and intervene with coping skills prior to having panic attacks, in order to continue his event  Reginaldo Bustos continues to make progress towards goals through verbal participation in group  Continue with psychotherapy     TX Plan Objectives: 1 1, 1 2, 1 4   Therapist: Claudell Baars, MA

## 2021-01-19 ENCOUNTER — OFFICE VISIT (OUTPATIENT)
Dept: PSYCHOLOGY | Facility: CLINIC | Age: 56
End: 2021-01-19
Payer: COMMERCIAL

## 2021-01-19 DIAGNOSIS — F33.2 MAJOR DEPRESSIVE DISORDER, RECURRENT SEVERE WITHOUT PSYCHOTIC FEATURES (HCC): Primary | ICD-10-CM

## 2021-01-19 PROCEDURE — S0201 PARTIAL HOSPITALIZATION SERV: HCPCS

## 2021-01-19 PROCEDURE — G0410 GRP PSYCH PARTIAL HOSP 45-50: HCPCS

## 2021-01-19 PROCEDURE — G0177 OPPS/PHP; TRAIN & EDUC SERV: HCPCS

## 2021-01-19 NOTE — PSYCH
This note was not shared with the patient due to this is a psychotherapy note  Subjective:     Patient ID: Cristela Chang is a 54 y o  male  [facility] Clinical Progress Notes      Specialized Services Documentation  Therapist must complete separate progress note for each specific clinical activity in which the individual participated during the day  Group Psychotherapy Life Skills Group (10:25-11:10) Chitra Abernathy actively engaged in group focused on the stress cycle which was facilitated virtually in a private office using HIPAA Compliant and Approved [employer] Teams  Chitra Abernathy consented to the use of tele-video modality of treatment and was virtually present for group psychotherapy today  During group, each group member talked about something stressful in their life and what their stress symptoms are  Chitra Abernathy shared that one of his stress symptoms is obsessively watching the news   Chitra Abernathy identified that a past negative way that he coped was watch the news for the majority of the day  Chitra Abernathy stated that some positive coping skills he intends to use are decreasing the amount of time he watches the news to once in the evening  The group discussed the importance of recognizing the warning signs of stress and why it is important to cope positively  Chitra Abernathy continues to make progress towards goals through verbal participation in group; to accomplish long term goals continue to utilize skills learned in programming  Continue with psychotherapy to educate and encourage use of wellness tools   Tx Plan Objective: 1 1,1 2 Therapist:  TIM Greene

## 2021-01-19 NOTE — PSYCH
This note was not shared with the patient due to this is a psychotherapy note      Subjective:     Patient ID: Abhijeet Greene is a 54 y o  male  [facility] Clinical Progress Notes      Specialized Services Documentation  Therapist must complete separate progress note for each specific clinical activity in which the individual participated during the day  This group was facilitated virtually in a private office using [employer] and Approved Microsoft Teams  Abhijeet Greene consented to the use of tele-video modality of treatment  Group Psychotherapy     ([id number]) Abhijeet Greene was present in psychoeducational  group about depression  The group viewed the second part of an educational video about depression called, Depression: Out of the Shadows   Video educated the group on signs and symptoms of depression, medications, and therapies  The second half of the video depicted medication as well as non medication treatment  Video  discussed the positive outcomes for those who seek help  Video and discussion afterward offered group members a chance to further explore and become educated on their own diagnosis  Good progress towards goals through engagement and participation  Continue psychoeducational groups to educate on diagnosis      Tx Plan Objective:  1 3, 1 4 Therapist:  Margie Ray RN

## 2021-01-19 NOTE — PSYCH
This note was not shared with the patient due to this is a psychotherapy note   Subjective:     Patient ID: Rey Mitchell is a 54 y o  male  [facility] Clinical Progress Notes      Specialized Services Documentation  Therapist must complete separate progress note for each specific clinical activity in which the individual participated during the day  Case Management Note    Charmaine MARTINEZBC    Current suicide risk : Low     Not a case management day for Rey Mitchell  He did not reach out with any additional questions or concerns  STD paperwork completed  Medications changes/added/denied? No    Treatment session number: 5    Individual Case Management Visit provided today?  Yes     [facility] follow up physician's orders: na

## 2021-01-19 NOTE — PSYCH
Virtual Regular Visit      Assessment/Plan:    Problem List Items Addressed This Visit        Other    Major depressive disorder, recurrent severe without psychotic features ([facility] [address] ) - Primary               Reason for visit is VIRTUAL PHP DUE TO COVID-19       [address]    Provider located at [address] [id number]      Recent Visits  Date Type Provider Dept   01/18/21 [facility]   01/15/21 [facility]   01/14/21 [facility]   01/13/21 [facility]   01/12/21 [facility]   Showing recent visits within past 7 days and meeting all other requirements     Today's Visits  Date Type Provider Dept   01/19/21 [facility] Visit [facility]   Showing today's visits and meeting all other requirements     Future Appointments  No visits were found meeting these conditions  Showing future appointments within next 150 days and meeting all other requirements        The patient was identified by name and date of birth  Todd Méndez was informed that this is a telemedicine visit and that the visit is being conducted through [employer] and patient was informed that this is a secure, HIPAA-compliant platform  He agrees to proceed     My office door was closed  No one else was in the room  He acknowledged consent and understanding of privacy and security of the video platform  The patient has agreed to participate and understands they can discontinue the visit at any time  Patient is aware this is a billable service  Subjective  Todd Méndez is a 54 y o  male          HPI     Past Medical History:   Diagnosis Date    Anxiety     Depression     Diabetes mellitus ([facility] [address] )     Hypertension        Past Surgical History:   Procedure Laterality Date    BACK SURGERY         Current Outpatient Medications   Medication Sig Dispense Refill    amLODIPine (NORVASC) 5 mg tablet Take 5 mg by mouth daily      atorvastatin (LIPITOR) 40 mg tablet Take 40 mg by mouth daily      buPROPion (WELLBUTRIN XL) 300 mg 24 hr tablet Take 1 tablet (300 mg total) by mouth daily 30 tablet 0    DULoxetine (CYMBALTA) 60 mg delayed release capsule Take 60 mg by mouth daily      gabapentin (NEURONTIN) 600 MG tablet Take 600 mg by mouth 3 (three) times a day      hydrochlorothiazide (HYDRODIURIL) 25 mg tablet Take 25 mg by mouth daily      hydrOXYzine HCL (ATARAX) 50 mg tablet Take 50 mg by mouth daily as needed for anxiety Half to 1 tablet daily p r n  For severe anxiety      insulin lispro (HumaLOG) 100 units/mL by Subcutaneous Insulin Pump route as needed      lisinopril (ZESTRIL) 40 mg tablet Take 40 mg by mouth daily      metFORMIN (GLUCOPHAGE) 1000 MG tablet Take 1,000 mg by mouth 2 (two) times a day with meals      metoprolol succinate (TOPROL-XL) 50 mg 24 hr tablet Take 25 mg by mouth daily      Multiple Vitamin (MULTIVITAMIN) tablet Take 1 tablet by mouth daily      tamsulosin (FLOMAX) 0 4 mg Take 0 4 mg by mouth daily with dinner       No current facility-administered medications for this visit  Allergies   Allergen Reactions    Morphine GI Intolerance    Tetanus Toxoid Swelling    Tetanus-Diphth-Acell Pertussis [Tetanus-Diphth-Acell Pertussis] GI Intolerance       Review of Systems    Video Exam    There were no vitals filed for this visit  Physical Exam     I spent 4 hours of group + case management minutes with patient today in which greater than 50% of the time was spent in counseling/coordination of care regarding see notes  VIRTUAL VISIT DISCLAIMER    Jada Putnam acknowledges that he has consented to an online visit or consultation   He understands that the online visit is based solely on information provided by him, and that, in the absence of a face-to-face physical evaluation by the physician, the diagnosis he receives is both limited and provisional in terms of accuracy and completeness  This is not intended to replace a full medical face-to-face evaluation by the physician  Sivan Neves understands and accepts these terms

## 2021-01-19 NOTE — PSYCH
This note was not shared with the patient due to this is a psychotherapy note    Subjective:     Patient ID: Sancho Bazan is a 54 y o  male  [facility] Clinical Progress Notes      Specialized Services Documentation  Therapist must complete separate progress note for each specific clinical activity in which the individual participated during the day  Group Psychotherapy [id number]    This group was facilitated virtually in a private office using Pager and Approved Microsoft Teams  Sancho Bazan consented to the use of tele-video modality of treatment  Psychotherapy group focused on mindfulness  Sancho Bazan explored what mindfulness is, the benefits of practicing mindfulness, and participated in a guided visualization walk on the beach meditation  Sancho Bazan then participated in practicing the 1,9,8,3,7 grounding technique  Sancho Bazan appeared to be visually participating in the meditation and exercises  Positive progress toward goals  Sancho Bazan is encouraged to continue to make progress towards goals and objectives through group participation and will continue to attend psychotherapy group            Tx Plan Objective: 1 1,1 2, Therapist:  TIM Strange

## 2021-01-19 NOTE — PSYCH
This note was not shared with the patient due to this is a psychotherapy note   Subjective:     Patient ID: Cristela Chang is a 54 y o  male  [facility] Clinical Progress Notes      Specialized Services Documentation  Therapist must complete separate progress note for each specific clinical activity in which the individual participated during the day  Education Therapy   [id number] Cristela Chang actively shared in morning assessment and goal review  Presented as Receptive related to readiness to learn  Cristela Chang did complete goal from last treatment day identifying gaining a sense of responsibility  did not present with any barriers to learning  Teresa engaged throughout the treatment day  Was engaged in learning related to Illness, Aftercare and Wellness Tools  Staff utilized Verbal and A/V teaching methods  Cristela Chang shared area of learning and set a goal for outside of program to  some prescriptions        Tx Plan Objective: 1 4, Therapist:  Lalo Maldonado MA

## 2021-01-20 ENCOUNTER — IMMUNIZATIONS (OUTPATIENT)
Dept: FAMILY MEDICINE CLINIC | Facility: HOSPITAL | Age: 56
End: 2021-01-20

## 2021-01-20 ENCOUNTER — OFFICE VISIT (OUTPATIENT)
Dept: PSYCHOLOGY | Facility: CLINIC | Age: 56
End: 2021-01-20
Payer: COMMERCIAL

## 2021-01-20 DIAGNOSIS — F33.2 MAJOR DEPRESSIVE DISORDER, RECURRENT SEVERE WITHOUT PSYCHOTIC FEATURES (HCC): Primary | ICD-10-CM

## 2021-01-20 DIAGNOSIS — Z23 ENCOUNTER FOR IMMUNIZATION: Primary | ICD-10-CM

## 2021-01-20 PROCEDURE — G0410 GRP PSYCH PARTIAL HOSP 45-50: HCPCS

## 2021-01-20 PROCEDURE — S0201 PARTIAL HOSPITALIZATION SERV: HCPCS

## 2021-01-20 PROCEDURE — 91301 SARS-COV-2 / COVID-19 MRNA VACCINE (MODERNA) 100 MCG: CPT

## 2021-01-20 PROCEDURE — G0177 OPPS/PHP; TRAIN & EDUC SERV: HCPCS

## 2021-01-20 PROCEDURE — G0176 OPPS/PHP;ACTIVITY THERAPY: HCPCS

## 2021-01-20 PROCEDURE — 0011A SARS-COV-2 / COVID-19 MRNA VACCINE (MODERNA) 100 MCG: CPT

## 2021-01-20 NOTE — PSYCH
This note was not shared with the patient due to this is a psychotherapy note   Subjective:     Patient ID: Rowena Zabala is a 54 y o  male  [facility] Clinical Progress Notes      Specialized Services Documentation  Therapist must complete separate progress note for each specific clinical activity in which the individual participated during the day  Allied Therapy   This group was facilitated virtually in a private office using [employer] and Approved [employer] Teams  Rowena Zabala consented to the use of tele-video modality of treatment  [id number]  Rowena Zabala   actively  shared in [facility] group focused on boundary setting  lourdes engaged in improvisation and discussion exploring value of and ways to set healthy limits with self and others  DBT strategy RADHA CLAUDIO introduced as skill to voice boundaries  He shared about setting boundaries with himself and participated in practicing boundaries with given scenarios  Some positive  work toward goal noted  Continue AT to encourage skill development and use  Tx Plan Objective: 1 2, Therapist:  Clark JERNIGAN    Case Management Note  This case management session was facilitated virtually in a private office using HIPPA Compliant and Approved [employer] Teams  Rowena Zabala consented to the use of tele-video modality of treatment  Clark JERNIGAN    Current suicide risk : Low     Not a scheduled case management day for Rowena Zabala  Case Management tomorrow  UR tomorrow  Medications changes/added/denied? No    Treatment session number: 6    Individual Case Management Visit provided today?  Yes     [facility] follow up physician's orders: na

## 2021-01-20 NOTE — PSYCH
Virtual Regular Visit      Assessment/Plan:    Problem List Items Addressed This Visit        Other    Major depressive disorder, recurrent severe without psychotic features ([facility] [address] ) - Primary               Reason for visit is VIRTUAL PHP DUE TO COVID-19       [facility] [address]    Provider located at [address] [address]      Recent Visits  Date Type Provider Dept   01/19/21 Office Visit [facility]   01/18/21 Office Visit [facility]   01/15/21 [facility] Visit [facility]   01/14/21 [facility] Visit [facility]   01/13/21 [facility] Visit [facility]   Showing recent visits within past 7 days and meeting all other requirements     Today's Visits  Date Type Provider Dept   01/20/21 Office Visit [facility]   Showing today's visits and meeting all other requirements     Future Appointments  No visits were found meeting these conditions  Showing future appointments within next 150 days and meeting all other requirements        The patient was identified by name and date of birth  Heike Dubose was informed that this is a telemedicine visit and that the visit is being conducted through [employer] and patient was informed that this is a secure, HIPAA-compliant platform  He agrees to proceed     My office door was closed  No one else was in the room  He acknowledged consent and understanding of privacy and security of the video platform  The patient has agreed to participate and understands they can discontinue the visit at any time  Patient is aware this is a billable service  Subjective  Heike Dubose is a 54 y o  male          HPI     Past Medical History:   Diagnosis Date    Anxiety     Depression     Diabetes mellitus ([facility] [address] )     Hypertension        Past Surgical History:   Procedure Laterality Date    BACK SURGERY         Current Outpatient Medications   Medication Sig Dispense Refill    amLODIPine (NORVASC) 5 mg tablet Take 5 mg by mouth daily      atorvastatin (LIPITOR) 40 mg tablet Take 40 mg by mouth daily      buPROPion (WELLBUTRIN XL) 300 mg 24 hr tablet Take 1 tablet (300 mg total) by mouth daily 30 tablet 0    DULoxetine (CYMBALTA) 60 mg delayed release capsule Take 60 mg by mouth daily      gabapentin (NEURONTIN) 600 MG tablet Take 600 mg by mouth 3 (three) times a day      hydrochlorothiazide (HYDRODIURIL) 25 mg tablet Take 25 mg by mouth daily      hydrOXYzine HCL (ATARAX) 50 mg tablet Take 50 mg by mouth daily as needed for anxiety Half to 1 tablet daily p r n  For severe anxiety      insulin lispro (HumaLOG) 100 units/mL by Subcutaneous Insulin Pump route as needed      lisinopril (ZESTRIL) 40 mg tablet Take 40 mg by mouth daily      metFORMIN (GLUCOPHAGE) 1000 MG tablet Take 1,000 mg by mouth 2 (two) times a day with meals      metoprolol succinate (TOPROL-XL) 50 mg 24 hr tablet Take 25 mg by mouth daily      Multiple Vitamin (MULTIVITAMIN) tablet Take 1 tablet by mouth daily      tamsulosin (FLOMAX) 0 4 mg Take 0 4 mg by mouth daily with dinner       No current facility-administered medications for this visit  Allergies   Allergen Reactions    Morphine GI Intolerance    Tetanus Toxoid Swelling    Tetanus-Diphth-Acell Pertussis [Tetanus-Diphth-Acell Pertussis] GI Intolerance       Review of Systems    Video Exam    There were no vitals filed for this visit  Physical Exam     I spent 4 hours of group + case management minutes with patient today in which greater than 50% of the time was spent in counseling/coordination of care regarding see notes  VIRTUAL VISIT DISCLAIMER    Hemanth Quiñones acknowledges that he has consented to an online visit or consultation   He understands that the online visit is based solely on information provided by him, and that, in the absence of a face-to-face physical evaluation by the physician, the diagnosis he receives is both limited and provisional in terms of accuracy and completeness  This is not intended to replace a full medical face-to-face evaluation by the physician  Tri Goyal understands and accepts these terms

## 2021-01-20 NOTE — PSYCH
This note was not shared with the patient due to this is a psychotherapy note  Subjective:     Patient ID: Yajaira Corrales is a 54 y o  male  [facility] Progress [facility]      Specialized Services Documentation  Therapist must complete separate progress note for each specific clinical activity in which the individual participated during the day  Group Psychotherapy Life Skills Group (10:25-11:10) Yanni Flores actively engaged in group focused on stress management which was facilitated virtually in a private office using HIPAA Compliant and Approved [employer] Teams  Yanni Flores consented to the use of tele-video modality of treatment and was virtually present for group psychotherapy today  During group we discussed the use of social support, emotional management skills, maintaining a healthy life balance and attending to basic needs as ways to help manage stress  Group members provided other suggestions on stress management techniques  Yanni Flores shared about reducing the negative effects of stress by using his social supports  Yanni Flores explored using his wife and his daughter as his social supports and discussed how they help him cope with his stress by keeping him calm down and going with him places   Yanni Flores continues to make progress towards goals through verbal participation in group; to accomplish long term goals continue to utilize skills learned in programming  Continue with psychotherapy to educate and encourage use of wellness tools   Tx Plan Objective: 1 1,1 2 Therapist:  TIM Marte

## 2021-01-20 NOTE — PSYCH
This note was not shared with the patient due to this is a psychotherapy note   Subjective:     Patient ID: Dru Gunderson is a 54 y o  male  [facility] Clinical Progress Notes      Specialized Services Documentation  Therapist must complete separate progress note for each specific clinical activity in which the individual participated during the day  GROUP PSYCHOTHERAPY ([id number]) Group was facilitated virtually in a private office using HIPAA Compliant and Approved [employer] Teams  Dru Gunderson consented to the use of tele-video modality of treatment and was virtually present for group psychotherapy today  The group engaged in watching a clip of Marsha Rodrigues as she discusses her experiences with different narratives  The group was encouraged, in stressful situations, ask themselves Nashpresley Banks is the story that I tell myself? May Fleeting attentively listened and shared their take away from the video clip  Ashley Robertson continues to make progress towards goals through verbal participation in group  Continue with psychotherapy     TX Plan Objectives: 1 1, 1 2, 1 4   Therapist: Mc Oscar MA

## 2021-01-20 NOTE — PSYCH
This note was not shared with the patient due to this is a psychotherapy note    Subjective:     Patient ID: Abigail Tyler is a 54 y o  male  [facility] Clinical Progress Notes      Specialized Services Documentation  Therapist must complete separate progress note for each specific clinical activity in which the individual participated during the day  Education Therapy   [id number] Abigail Tyler actively shared in morning assessment and goal review  Presented as Receptive related to readiness to learn  Abigail Tyler did complete goal from last treatment day identifying gaining  emotional wellness  did not present with any barriers to learning  Teresa engaged throughout the treatment day  Was engaged in learning related to [employer]  Staff utilized Verbal, Written, A/V, and Demonstration teaching methods  Abigail Tyler shared area of learning and set a goal for outside of program to get Covid vaccine        Tx Plan Objective: 1 4, Therapist:  Vanesa Reyes RN

## 2021-01-21 ENCOUNTER — OFFICE VISIT (OUTPATIENT)
Dept: PSYCHOLOGY | Facility: CLINIC | Age: 56
End: 2021-01-21
Payer: COMMERCIAL

## 2021-01-21 DIAGNOSIS — F33.2 MAJOR DEPRESSIVE DISORDER, RECURRENT SEVERE WITHOUT PSYCHOTIC FEATURES (HCC): Primary | ICD-10-CM

## 2021-01-21 PROCEDURE — G0177 OPPS/PHP; TRAIN & EDUC SERV: HCPCS

## 2021-01-21 PROCEDURE — S0201 PARTIAL HOSPITALIZATION SERV: HCPCS

## 2021-01-21 PROCEDURE — G0410 GRP PSYCH PARTIAL HOSP 45-50: HCPCS

## 2021-01-21 NOTE — PSYCH
This note was not shared with the patient due to this is a psychotherapy note    Subjective:     Patient ID: Cande Mcginnis is a 54 y o  male  [facility] Progress Notes      Specialized Services Documentation  Therapist must complete separate progress note for each specific clinical activity in which the individual participated during the day  Group Psychotherapy [id number]    This group was facilitated virtually in a private office using [employer] and Approved [employer] Teams  Cande Mcginnis consented to the use of tele-video modality of treatment  Psychotherapy group focused on reframing unhealthy thinking habits  The group began with a check in    Group members watched the [address], The Secret of Becoming Mentally Strong  Discussion took place where group members were encouraged to reflect on what negative mental habits are holding him/her back, what unhelpful beliefs are keeping him/her from being as mentally strong as he/she can be, and what is one small step he/she can begin to take today  Cande Mcginnis shared his difficulty with past trauma and habit of using alcohol to avoid feelings of grief and depression, and how building resilience and accepting what needed to be changed, brought him to program and wanting to make positive changes in his life  Positive progress toward goals  Cande Mcginnis is encouraged to continue make progress towards goals and objectives through group participation and will continue to attend psychotherapy group         Tx Plan Objective: 1 1,1 2, Therapist:  TIM Ramsey

## 2021-01-21 NOTE — PSYCH
This note was not shared with the patient due to this is a psychotherapy note      Subjective:     Patient ID: Angus Ribeiro is a 54 y o  male  [facility] Clinical Progress Notes      Specialized Services Documentation  Therapist must complete separate progress note for each specific clinical activity in which the individual participated during the day  This group was facilitated virtually in a private office using [employer] and Approved Microsoft Teams  Angus Ribeiro consented to the use of tele-video modality of treatment  This note was not shared with the patient due to this is a psychotherapy note    Subjective:     Patient ID: Angus Ribeiro is a 54 y o  male  [facility] Clinical Progress Notes      Specialized Services Documentation  Therapist must complete separate progress note for each specific clinical activity in which the individual participated during the day  Education Therapy   [id number] Angus Ribeiro actively shared in morning assessment and goal review  Presented as Receptive related to readiness to learn  Angus Ribeiro did complete goal from last treatment day identifying gaining  emotional wellness  did not present with any barriers to learning  Teresa engaged throughout the treatment day  Was engaged in learning related to [employer]  Staff utilized Verbal, Written, A/V, and Demonstration teaching methods  Angus Ribeiro shared area of learning and set a goal for outside of program to advocate for self  Tx Plan Objective: 1 4, Therapist:  Casey Martinez RN      Group Psychotherapy     ([id number]) Angus Ribeiro  actively participated in psychoeducational group on mental health  The group played mental health jeopardy  Group learned about diagnosis, treatments, coping skills, social supports and self-esteem  Group was able to use questions as prompts for further discussions  They were engaged and participatory through entire game   Good progress towards goal  Continue psychoeducational group to educate about mental health and treatments      Tx Plan Objective: 1 3, 1 4 Therapist:  Cristel Barrett RN

## 2021-01-21 NOTE — PSYCH
Virtual Regular Visit      Assessment/Plan:    Problem List Items Addressed This Visit        Other    Major depressive disorder, recurrent severe without psychotic features ([facility] [address] ) - Primary               Reason for visit is VIRTUAL PHP DUE TO COVID-19       [facility] [address] [facility] [address]    Provider located at [address] [address]      Recent Visits  Date Type Provider Dept   01/20/21 [facility]   01/19/21 [facility] Visit [facility]   01/18/21 [facility]   01/15/21 [facility]   01/14/21 [facility]   Showing recent visits within past 7 days and meeting all other requirements     Today's Visits  Date Type Provider Dept   01/21/21 [facility] Visit [facility]   Showing today's visits and meeting all other requirements     Future Appointments  No visits were found meeting these conditions  Showing future appointments within next 150 days and meeting all other requirements        The patient was identified by name and date of birth  Liat España was informed that this is a telemedicine visit and that the visit is being conducted through [employer] and patient was informed that this is a secure, HIPAA-compliant platform  He agrees to proceed     My office door was closed  No one else was in the room  He acknowledged consent and understanding of privacy and security of the video platform  The patient has agreed to participate and understands they can discontinue the visit at any time  Patient is aware this is a billable service  Subjective  Liat España is a 54 y o  male          HPI     Past Medical History:   Diagnosis Date    Anxiety     Depression     Diabetes mellitus ([facility] [address] )     Hypertension        Past Surgical History:   Procedure Laterality Date    BACK SURGERY         Current Outpatient Medications   Medication Sig Dispense Refill    amLODIPine (NORVASC) 5 mg tablet Take 5 mg by mouth daily      atorvastatin (LIPITOR) 40 mg tablet Take 40 mg by mouth daily      buPROPion (WELLBUTRIN XL) 300 mg 24 hr tablet Take 1 tablet (300 mg total) by mouth daily 30 tablet 0    DULoxetine (CYMBALTA) 60 mg delayed release capsule Take 60 mg by mouth daily      gabapentin (NEURONTIN) 600 MG tablet Take 600 mg by mouth 3 (three) times a day      hydrochlorothiazide (HYDRODIURIL) 25 mg tablet Take 25 mg by mouth daily      hydrOXYzine HCL (ATARAX) 50 mg tablet Take 50 mg by mouth daily as needed for anxiety Half to 1 tablet daily p r n  For severe anxiety      insulin lispro (HumaLOG) 100 units/mL by Subcutaneous Insulin Pump route as needed      lisinopril (ZESTRIL) 40 mg tablet Take 40 mg by mouth daily      metFORMIN (GLUCOPHAGE) 1000 MG tablet Take 1,000 mg by mouth 2 (two) times a day with meals      metoprolol succinate (TOPROL-XL) 50 mg 24 hr tablet Take 25 mg by mouth daily      Multiple Vitamin (MULTIVITAMIN) tablet Take 1 tablet by mouth daily      tamsulosin (FLOMAX) 0 4 mg Take 0 4 mg by mouth daily with dinner       No current facility-administered medications for this visit  Allergies   Allergen Reactions    Morphine GI Intolerance    Tetanus Toxoid Swelling    Tetanus-Diphth-Acell Pertussis [Tetanus-Diphth-Acell Pertussis] GI Intolerance       Review of Systems    Video Exam    There were no vitals filed for this visit  Physical Exam     I spent 4 hours of group + case management minutes with patient today in which greater than 50% of the time was spent in counseling/coordination of care regarding see notes  VIRTUAL VISIT DISCLAIMER    Liat España acknowledges that he has consented to an online visit or consultation   He understands that the online visit is based solely on information provided by him, and that, in the absence of a face-to-face physical evaluation by the physician, the diagnosis he receives is both limited and provisional in terms of accuracy and completeness  This is not intended to replace a full medical face-to-face evaluation by the physician  Sivan Neves understands and accepts these terms

## 2021-01-21 NOTE — PSYCH
This note was not shared with the patient due to this is a psychotherapy note   Subjective:     Patient ID: Nyasia Stiles is a 54 y o  male  [facility] Progress Notes      Specialized Services Documentation  Therapist must complete separate progress note for each specific clinical activity in which the individual participated during the day  Other Utilization Review: [address] with Eliana Mcdermott at [facility] phone [phone],  10 IOP  days authorized from 1/22/2021 to 2/12/2021  Authorization number [id number]  Shira Child, [facility]     Case Management Note    Shira Child [facility]    Current suicide risk : Low     This case management session was facilitated virtually in a private office using [employer] and Approved [employer] Teams  Nyasia Stiles consented to the use of tele-video modality of treatment  [id number] Met with Nyasia Stiles  Reported that he did not have a good experience yesterday when getting COVID-19 vaccine  He felt the set up where he went was not following CDC guidelines and made him feel uncomfortable  He reported feelings of panic and almost leaving  He did stay and received injection  He has been ruminating since  He feels he needs to write a letter to the President of the facility  Discussed taking action versus rumination, however once taking action how will he refocus  Reviewed  Completion of disability paperwork  Medications changes/added/denied? No    Treatment session number: 7    Individual Case Management Visit provided today?  Yes     [facility] follow up physician's orders: na

## 2021-01-22 ENCOUNTER — OFFICE VISIT (OUTPATIENT)
Dept: PSYCHOLOGY | Facility: CLINIC | Age: 56
End: 2021-01-22
Payer: COMMERCIAL

## 2021-01-22 DIAGNOSIS — F33.2 MAJOR DEPRESSIVE DISORDER, RECURRENT SEVERE WITHOUT PSYCHOTIC FEATURES (HCC): Primary | ICD-10-CM

## 2021-01-22 PROCEDURE — S9480 INTENSIVE OUTPATIENT PSYCHIA: HCPCS

## 2021-01-22 NOTE — PSYCH
This note was not shared with the patient due to this is a psychotherapy note  Subjective:     Patient ID: Liat España is a 54 y o  male  [facility] Clinical Progress Notes      Specialized Services Documentation  Therapist must complete separate progress note for each specific clinical activity in which the individual participated during the day  Group Psychotherapy Life Skills Group (10:25-11:10) Diandra Puentesraymon actively engaged in group focused on communicating with I statements which was facilitated virtually in a private office using HIPAA Compliant and Approved [employer] Teams  Diandra Puentesraymon consented to the use of tele-video modality of treatment and was virtually present for group psychotherapy today  Group members shared you statements that they have heard  Other group members responded to those "you" statements by using I statements  The group explored the benefits of using "I" statements  Diandra Nascimento stated a you statement that he has heard  Diandra Nascimento gave the example of an I statement he would say is, I feel upset because I am passionate about this  I want you to understand that this hobby is how I cope with stress  Diandra Nascimento continues to make progress towards goals through verbal participation in group; to accomplish long term goals continue to utilize skills learned in programming  Continue with psychotherapy to educate and encourage use of wellness tools   Tx Plan Objective: 1 1,1 2 Therapist:  TIM Villalta

## 2021-01-22 NOTE — PSYCH
This note was not shared with the patient due to this is a psychotherapy note   Subjective:     Patient ID: Sylvia Hassan is a 54 y o  male  [facility] Clinical Progress Notes      Specialized Services Documentation  Therapist must complete separate progress note for each specific clinical activity in which the individual participated during the day  Education Therapy   [id number] Sylvia Hassan actively shared in morning assessment and goal review  Presented as Receptive related to readiness to learn  Sylvia Hassan did complete goal from last treatment day identifying gaining advocacy/support  did not present with any barriers to learning  Teresa engaged throughout the treatment day  Was engaged in learning related to Illness, Medication, Aftercare and Wellness Tools  Staff utilized Verbal, Written, A/V and Demonstration teaching methods  Sylvia Hassan shared area of learning and set a goal for outside of program to work on I statements        Tx Plan Objective: 1 1, 1 2, 1 4 Therapist:  Jeromy Bender MA

## 2021-01-22 NOTE — PSYCH
This note was not shared with the patient due to this is a psychotherapy note    Subjective:     Patient ID: Cristela Chang is a 54 y o  male  [facility] Clinical Progress Notes      Specialized Services Documentation  Therapist must complete separate progress note for each specific clinical activity in which the individual participated during the day  Group Psychotherapy     ([id number]) Cristela Chang actively shared in psychotherapy group which focused on Weekly Wellness Assessment  Theyengaged in self-rate and discussion  Group members individually went through the assessment and rated themselves based on the past week  Group members shared assessment results  Group discussed the six domains of wellness; physical, emotional, cognitive, vocational, social, and spiritual  Group discussed strengths, challenges, barriers, and ways to increase each domain in and open forum and developed goals  Good progress towards goal observed and shared  Continue psychotherapy to further encourage self-reflection on strengths and challenges with personal wellness      Tx Plan Objective:  1 1,1 2,1 3,1 4 Therapist:  Enio Morris RN

## 2021-01-22 NOTE — PSYCH
Virtual Regular Visit      Assessment/Plan:    Problem List Items Addressed This Visit        Other    Major depressive disorder, recurrent severe without psychotic features ([facility] [address] ) - Primary               Reason for visit is VIRTUAL PHP DUE TO COVID-19       Encounter provider [facility] [address]    Provider located at [address] [address]      Recent Visits  Date Type Provider Dept   01/21/21 Office Visit [facility]   01/20/21 Office Visit [facility]   01/19/21 Office Visit [facility]   01/18/21 [facility] Visit [facility]   01/15/21 Office Visit [facility]   Showing recent visits within past 7 days and meeting all other requirements     Today's Visits  Date Type Provider Dept   01/22/21 Office Visit [facility]   Showing today's visits and meeting all other requirements     Future Appointments  No visits were found meeting these conditions  Showing future appointments within next 150 days and meeting all other requirements        The patient was identified by name and date of birth  Abhijeet Greene was informed that this is a telemedicine visit and that the visit is being conducted through [employer] and patient was informed that this is a secure, HIPAA-compliant platform  He agrees to proceed     My office door was closed  No one else was in the room  He acknowledged consent and understanding of privacy and security of the video platform  The patient has agreed to participate and understands they can discontinue the visit at any time  Patient is aware this is a billable service  Subjective  Abhijeet Greene is a 54 y o  male          HPI     Past Medical History:   Diagnosis Date    Anxiety     Depression     Diabetes mellitus ([facility] [address] )     Hypertension        Past Surgical History:   Procedure Laterality Date    BACK SURGERY         Current Outpatient Medications   Medication Sig Dispense Refill    amLODIPine (NORVASC) 5 mg tablet Take 5 mg by mouth daily      atorvastatin (LIPITOR) 40 mg tablet Take 40 mg by mouth daily      buPROPion (WELLBUTRIN XL) 300 mg 24 hr tablet Take 1 tablet (300 mg total) by mouth daily 30 tablet 0    DULoxetine (CYMBALTA) 60 mg delayed release capsule Take 60 mg by mouth daily      gabapentin (NEURONTIN) 600 MG tablet Take 600 mg by mouth 3 (three) times a day      hydrochlorothiazide (HYDRODIURIL) 25 mg tablet Take 25 mg by mouth daily      hydrOXYzine HCL (ATARAX) 50 mg tablet Take 50 mg by mouth daily as needed for anxiety Half to 1 tablet daily p r n  For severe anxiety      insulin lispro (HumaLOG) 100 units/mL by Subcutaneous Insulin Pump route as needed      lisinopril (ZESTRIL) 40 mg tablet Take 40 mg by mouth daily      metFORMIN (GLUCOPHAGE) 1000 MG tablet Take 1,000 mg by mouth 2 (two) times a day with meals      metoprolol succinate (TOPROL-XL) 50 mg 24 hr tablet Take 25 mg by mouth daily      Multiple Vitamin (MULTIVITAMIN) tablet Take 1 tablet by mouth daily      tamsulosin (FLOMAX) 0 4 mg Take 0 4 mg by mouth daily with dinner       No current facility-administered medications for this visit  Allergies   Allergen Reactions    Morphine GI Intolerance    Tetanus Toxoid Swelling    Tetanus-Diphth-Acell Pertussis [Tetanus-Diphth-Acell Pertussis] GI Intolerance       Review of Systems    Video Exam    There were no vitals filed for this visit  Physical Exam     I spent 4 hours of group + case management minutes with patient today in which greater than 50% of the time was spent in counseling/coordination of care regarding see notes      VIRTUAL VISIT DISCLAIMER    Daphnie Limon acknowledges that he has consented to an online visit or consultation   He understands that the online visit is based solely on information provided by him, and that, in the absence of a face-to-face physical evaluation by the physician, the diagnosis he receives is both limited and provisional in terms of accuracy and completeness  This is not intended to replace a full medical face-to-face evaluation by the physician  Coby Cooper understands and accepts these terms

## 2021-01-22 NOTE — PSYCH
[facility] Clinical Progress Notes      Specialized Services Documentation  Therapist must complete separate progress note for each specific clinical activity in which the individual participated during the day         [facility] follow up physician's orders:   DATE 1/22/2021  TIME 10:08 AM   [facility] TODAY  Bernie Newman MD

## 2021-01-22 NOTE — PSYCH
Assessment/Plan:      Diagnoses and all orders for this visit:    Major depressive disorder, recurrent severe without psychotic features ([facility] [address] )          Subjective:     Patient ID: Heike Dubose is a 54 y o  male  [facility] Treatment Plan   AREAS OF NEED: Depression as evidenced by anhedonia, lack of interest, negative thoughts with hopelessness, anxiety, isolation, sleep disturbance related to COVID-19 and work/financial stressors  Date Initiated: 1/12/2021    Strengths: family support, wants to improve     LONG TERM GOAL:   Date Initiated: 1/12/2021  1 0 I will identify 3 signs that my depression has lessened and I am more productive in my day to day life  Target Date: 2/9/2021  Completion Date:       SHORT TERM OBJECTIVES:     Date Initiated: 1/12/2021  1 1 I will identify 3 mindfulness and/or distress tolerance skills I am practicing to refocus negative and/or fear thoughts  Revision Date:   Target Date: 1/22/2021  Completion Date: 1/22/2021    Date Initiated: 1/12/2021  1 2 I will identify 3 things I need to do daily to increase personal structure and 2 things I may need to avoid throughout my day  Revision Date:   Target Date: 1/22/2021  Completion Date: 1/22/2021    Date Initiated: 1/12/2021  1 3 I will take medications as prescribed and share questions and concerns if arise  Revision Date: 1/22/2021  Target Date: 1/22/2021  Completion Date:      Date Initiated: 1/12/2021  1 4 I will identify 3 ways my supports can assist in my recovery and agree to staff/support contact as indicated      Revision Date: 1/22/2021  Target Date: 1/22/2021  Completion Date:            7 DAY REVISION:    Date Initiated:1/22/2021  1 5 I will begin to set boundaries with myself in relation to sleep hygiene, activity level/getting things done, and my own "attitude"/ thoughts - sharing challenges and successes  Revision Date:   Target Date: 02/12/2021  Completion Date:      PSYCHIATRY:  Date Initiated: 1/12/2021  Medication Management and Education       Revision Date: 1/22/2021       Continue medication management  The person(s) responsible for carrying out the plan is Deyanira Thomas MD    NURSING:   Date Initiated: 1/12/2021  1 1,1 2,1 3,1 4 This RN will provide daily wellness group five days weekly to educate Tri Goyal on S/S of his diagnoses and medications used in treatment  Revision Date:1/22/2021  1 1,1 2,1 3,1 4,1 5 Continue to encourage Tri Goyal to participate in wellness groups daily to learn about symptoms, coping strategies and warning signs to promote relapse prevention  The person(s) responsible for carrying out the plan is Shantel Larson RN    PSYCHOLOGY:   Date Initiated: 1/12/2021       1 1, 1 2, 1 4 Provide psychotherapy group 5 times per week to allow opportunity for Tri Goyal to explore stressors and ways of coping  Revision Date: 1/22/2021  1 1,1 2,1 4,1 5 Continue to provide psychotherapy group daily to Tri Goyal and encourage sharing of stressors, skills and positive change  The person(s) responsible for carrying out the plan is Blase Felty, [place]    ALLIED THERAPY:   Date Initiated: 1/12/2021  1 1,1 2 Engage Tri Goyal in AT group 5 times daily to encourage development and use of wellness tools to decrease symptoms and promote recovery through meaningful activity  Revision Date: 1/22/2021  1 1,1 2,1 5 Continue to engage Tri Goyal to participate in AT group to practice wellness tools within program and transfer to home sharing successes and barriers through healthy task involvement  The person(s) responsible for carrying out the plan is DELON Zuniga     CASE MANAGEMENT:   Date Initiated: 1/12/2021      1 0 This  will meet with Tri Goyal 3-4 times weekly to assess treatment progress, discharge planning, connection to community supports and UR as indicated    Revision Date: 1/22/2021  1 0 Continue to meet with Tri oGyal 3-4 times weekly to assess growth, work toward goals, continued treatment needs, dc planning and use of supports  The person(s) responsible for carrying out the plan is Shira Lloyd, DELON          TREATMENT REVIEW/COMMENTS:     DISCHARGE CRITERIA: Identify 3 signs of progress and complete relapse prevention plan  DISCHARGE PLAN: OP providers  Estimated Length of Stay:10 treatment days    Diagnosis and Treatment Plan explained to Willian Davis relates understanding diagnosis and is agreeable to Treatment Plan         CLIENT COMMENTS / Please share your thoughts, feelings, need and/or experiences regarding your treatment plan: _____________________________________________________________________________________________________________________________________________________________________________________________________________________________________________________________________________________________________________________ Date/Time: ______________     Patient Signature: ___reviewed and revised with Nyasia Diazw - sent via email________________________     Date/Time: __1/22/2021 1244____________      Signature: __Mary Anne Serna Deter MT-BC_______________________________     Date/Time: _____1/2222/2021 1244__________

## 2021-01-22 NOTE — PSYCH
This note was not shared with the patient due to this is a psychotherapy note       Subjective:     Patient ID: Sivan Neves is a 54 y o  male  [facility] Clinical Progress Notes      Specialized Services Documentation  Therapist must complete separate progress note for each specific clinical activity in which the individual participated during the day  Allied Therapy   This group was facilitated virtually in a private office using [employer] and [employer] Modular Teams  Sivan Neves consented to the use of tele-video modality of treatment  [id number] Sivan Neves  actively shared in [facility] group focused on managing anger and emotional regulation skills  Kevin Orlandowilliam engaged in task exploring effective versus ineffective ways in addition to skills to refocus in the moment  He was active in discussion  Group explored the benefits of positive choices with intense emotion and factors that increase emotional vulnerability  Some  work toward goal noted   Continue AT to explore wellness tool awareness and practice  Tx Plan Objective: 1 1,1 2, Therapist:  Isael JERNIGAN      Case Management Note    Isael JERNIGAN    Current suicide risk : Low     This case management session was facilitated virtually in a private office using [employer] and [employer] Modular Teams  Sivan Neves consented to the use of tele-video modality of treatment  [id number] 1257 Met with Sivan Bowlings to discuss weekend plans and supports  Sivanshay Neves identified gains from the week and using daughter in [place] as a support yesterday  He feels he was able to identify facts of trigger and feels it is important to write things out  Treatment plan reviewed and updated  Goals for next week include solidifying gains and working on personal boundaries  Medications changes/added/denied? No    Treatment session number: 8 IOP 1    Individual Case Management Visit provided today?  Yes     [facility] follow up physician's orders: see IOP order

## 2021-01-25 ENCOUNTER — OFFICE VISIT (OUTPATIENT)
Dept: PSYCHOLOGY | Facility: CLINIC | Age: 56
End: 2021-01-25
Payer: COMMERCIAL

## 2021-01-25 DIAGNOSIS — F33.2 MAJOR DEPRESSIVE DISORDER, RECURRENT SEVERE WITHOUT PSYCHOTIC FEATURES (HCC): Primary | ICD-10-CM

## 2021-01-25 PROCEDURE — S9480 INTENSIVE OUTPATIENT PSYCHIA: HCPCS

## 2021-01-25 NOTE — PSYCH
Virtual Regular Visit      Assessment/Plan:    Problem List Items Addressed This Visit        Other    Major depressive disorder, recurrent severe without psychotic features ([facility] [address] ) - Primary               Reason for visit is VIRTUAL PHP DUE TO COVID-19       [address] [facility] [address]    Provider located at [address] [address]      Recent Visits  Date Type Provider Dept   01/22/21 [facility] Visit [facility]   01/21/21 [facility] Visit [facility]   01/20/21 [facility]   01/19/21 [facility]   01/18/21 [facility]   Showing recent visits within past 7 days and meeting all other requirements     Future Appointments  No visits were found meeting these conditions  Showing future appointments within next 150 days and meeting all other requirements        The patient was identified by name and date of birth  Heike Dubose was informed that this is a telemedicine visit and that the visit is being conducted through [employer] and patient was informed that this is a secure, HIPAA-compliant platform  He agrees to proceed     My office door was closed  No one else was in the room  He acknowledged consent and understanding of privacy and security of the video platform  The patient has agreed to participate and understands they can discontinue the visit at any time  Patient is aware this is a billable service  Subjective  Heike Dubose is a 54 y o  male           HPI     Past Medical History:   Diagnosis Date    Anxiety     Depression     Diabetes mellitus ([facility] [address] )     Hypertension        Past Surgical History:   Procedure Laterality Date    BACK SURGERY         Current Outpatient Medications   Medication Sig Dispense Refill    amLODIPine (NORVASC) 5 mg tablet Take 5 mg by mouth daily      atorvastatin (LIPITOR) 40 mg tablet Take 40 mg by mouth daily      buPROPion (WELLBUTRIN XL) 300 mg 24 hr tablet Take 1 tablet (300 mg total) by mouth daily 30 tablet 0    DULoxetine (CYMBALTA) 60 mg delayed release capsule Take 60 mg by mouth daily      gabapentin (NEURONTIN) 600 MG tablet Take 600 mg by mouth 3 (three) times a day      hydrochlorothiazide (HYDRODIURIL) 25 mg tablet Take 25 mg by mouth daily      hydrOXYzine HCL (ATARAX) 50 mg tablet Take 50 mg by mouth daily as needed for anxiety Half to 1 tablet daily p r n  For severe anxiety      insulin lispro (HumaLOG) 100 units/mL by Subcutaneous Insulin Pump route as needed      lisinopril (ZESTRIL) 40 mg tablet Take 40 mg by mouth daily      metFORMIN (GLUCOPHAGE) 1000 MG tablet Take 1,000 mg by mouth 2 (two) times a day with meals      metoprolol succinate (TOPROL-XL) 50 mg 24 hr tablet Take 25 mg by mouth daily      Multiple Vitamin (MULTIVITAMIN) tablet Take 1 tablet by mouth daily      tamsulosin (FLOMAX) 0 4 mg Take 0 4 mg by mouth daily with dinner       No current facility-administered medications for this visit  Allergies   Allergen Reactions    Morphine GI Intolerance    Tetanus Toxoid Swelling    Tetanus-Diphth-Acell Pertussis [Tetanus-Diphth-Acell Pertussis] GI Intolerance       Review of Systems    Video Exam    There were no vitals filed for this visit  Physical Exam     I spent 4 hours of group + case management minutes with patient today in which greater than 50% of the time was spent in counseling/coordination of care regarding see notes      VIRTUAL VISIT DISCLAIMER    Rey Paula acknowledges that he has consented to an online visit or consultation   He understands that the online visit is based solely on information provided by him, and that, in the absence of a face-to-face physical evaluation by the physician, the diagnosis he receives is both limited and provisional in terms of accuracy and completeness  This is not intended to replace a full medical face-to-face evaluation by the physician  Neeraj Turk understands and accepts these terms

## 2021-01-25 NOTE — PSYCH
This note was not shared with the patient due to this is a psychotherapy note      Subjective:     Patient ID: Karley Colindres is a 54 y o  male  [facility] Clinical Progress Notes      Specialized Services Documentation  Therapist must complete separate progress note for each specific clinical activity in which the individual participated during the day  This group was facilitated virtually in a private office using [employer] and [employer] Teams  Karley Colindres consented to the use of tele-video modality of treatment  This note was not shared with the patient due to this is a psychotherapy note      Group Psychotherapy     ([id number]) Karley Colindres  was present in psychoeducational group which focused on exercise to improve physical and mental wellbeing  Topics included the benefits of exercise to improve physical and mental wellbeing  The group then participated in 20 minutes of low intensity chair yoga  They then discussed ideas on how to improve stamina and ways to incorporate exercise into their lives  Good progress toward goal noted  Continue psychoeducation to educate on the importance of making physical fitness a priority  Tx Plan Objective: 1 3, 1 4 Therapist:  Shannan Lopez RN      Education Therapy   [id number] Karley Colindres actively shared in morning assessment and goal review  Presented as Receptive related to readiness to learn  Karley Colindres did complete goal from last treatment day identifying gaining  emotional wellness  did not present with any barriers to learning  Teresa engaged throughout the treatment day  Was engaged in learning related to [employer]  Staff utilized Verbal, Written, A/V, and Demonstration teaching methods  Karley Colindres shared area of learning and set a goal for outside of program to work on mindfulness        Tx Plan Objective: 1 4, Therapist:  Shannan Lopez RN

## 2021-01-25 NOTE — PSYCH
This note was not shared with the patient due to this is a psychotherapy note       Subjective:     Patient ID: Wenceslao Vivar is a 54 y o  male  [facility] Clinical Progress Notes      Specialized Services Documentation  Therapist must complete separate progress note for each specific clinical activity in which the individual participated during the day  Allied Therapy   This group was facilitated virtually in a private office using [employer] and Approved [employer] Teams  Wenceslao Vivar consented to the use of tele-video modality of treatment  [id number] Wenceslao Vivar  actively shared in [facility] group focused on relaxation techniques and mindfulness strategies  Alphonse Vitale engaged in  review of the what skills of observe, describe and participate  Group introduced to and practiced the [facility] skills non-judgmental, one-mindfully, and effectiveness through various tasks  Group discussed ways to apply to slowing down to make more effective choices  Some effort noted toward treatment goal   Continue AT to encourage the development and practice of mindfulness strategies to alleviate symptoms and support wellness  Tx Plan Objective: 1 1, Therapist:  Sophy Ingram [facility]        Case Management Note    [facility]    Current suicide risk : Low     Not a case management day for Wenceslao Vivar  email sent to review the times for this week - no return email sharing any concerns or needs for today  Medications changes/added/denied? No    Treatment session number: 9 IOP 2    Individual Case Management Visit provided today?  No    [facility] follow up physician's orders: na

## 2021-01-25 NOTE — PSYCH
This note was not shared with the patient due to this is a psychotherapy note   Subjective:     Patient ID: Karley Colindres is a 54 y o  male  [facility] Clinical Progress Notes      Specialized Services Documentation  Therapist must complete separate progress note for each specific clinical activity in which the individual participated during the day  GROUP PSYCHOTHERAPY ([id number]) Group was facilitated virtually in a private office using HIPAA Compliant and Approved [employer] Teams  Karley Colindres consented to the use of tele-video modality of treatment and was virtually present for group psychotherapy today  The group was offered process time to discuss current stressors  Group discussion revolved around the quote of the day, positive reframing and personal disclosures  Obey Aiken presented as quiet and withdrawn from group  When asked, he explained that he had not been feeling well (nauseus, headache and tired) Obey Aiken made a few jokes with the group and was attentive during open discussions  Yosvanyshazia Aiken made slow progress towards goals through minimal verbal participation in group  Continue with Psychotherapy     TX Plan Objectives: 1 1, 1 2, 1 4   Therapist: Nain Aguirre MA

## 2021-01-26 ENCOUNTER — OFFICE VISIT (OUTPATIENT)
Dept: PSYCHOLOGY | Facility: CLINIC | Age: 56
End: 2021-01-26
Payer: COMMERCIAL

## 2021-01-26 DIAGNOSIS — F33.2 MAJOR DEPRESSIVE DISORDER, RECURRENT SEVERE WITHOUT PSYCHOTIC FEATURES (HCC): Primary | ICD-10-CM

## 2021-01-26 PROCEDURE — S0201 PARTIAL HOSPITALIZATION SERV: HCPCS

## 2021-01-26 PROCEDURE — S9480 INTENSIVE OUTPATIENT PSYCHIA: HCPCS

## 2021-01-26 PROCEDURE — G0410 GRP PSYCH PARTIAL HOSP 45-50: HCPCS
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
## 2021-01-26 NOTE — PSYCH
Virtual Regular Visit      Assessment/Plan:    Problem List Items Addressed This Visit        Other    Major depressive disorder, recurrent severe without psychotic features ([facility] [address] ) - Primary               Reason for visit is VIRTUAL PHP DUE TO COVID-19       [address]    Provider located at [address] [address]      Recent Visits  Date Type Provider Dept   01/25/21 Office Visit [facility]   01/22/21 Office Visit [facility]   01/21/21 Office Visit [facility]   01/20/21 [facility] Visit [facility]   01/19/21 Office Visit [facility]   Showing recent visits within past 7 days and meeting all other requirements     Future Appointments  No visits were found meeting these conditions  Showing future appointments within next 150 days and meeting all other requirements        The patient was identified by name and date of birth  Coby Cooper was informed that this is a telemedicine visit and that the visit is being conducted through [employer] and patient was informed that this is a secure, HIPAA-compliant platform  He agrees to proceed     My office door was closed  No one else was in the room  He acknowledged consent and understanding of privacy and security of the video platform  The patient has agreed to participate and understands they can discontinue the visit at any time  Patient is aware this is a billable service  Subjective  Coby Cooper is a 54 y o  male          HPI     Past Medical History:   Diagnosis Date    Anxiety     Depression     Diabetes mellitus ([facility] [address] )     Hypertension        Past Surgical History:   Procedure Laterality Date    BACK SURGERY         Current Outpatient Medications   Medication Sig Dispense Refill    amLODIPine (NORVASC) 5 mg tablet Take 5 mg by mouth daily      atorvastatin (LIPITOR) 40 mg tablet Take 40 mg by mouth daily      buPROPion (WELLBUTRIN XL) 300 mg 24 hr tablet Take 1 tablet (300 mg total) by mouth daily 30 tablet 0    DULoxetine (CYMBALTA) 60 mg delayed release capsule Take 60 mg by mouth daily      gabapentin (NEURONTIN) 600 MG tablet Take 600 mg by mouth 3 (three) times a day      hydrochlorothiazide (HYDRODIURIL) 25 mg tablet Take 25 mg by mouth daily      hydrOXYzine HCL (ATARAX) 50 mg tablet Take 50 mg by mouth daily as needed for anxiety Half to 1 tablet daily p r n  For severe anxiety      insulin lispro (HumaLOG) 100 units/mL by Subcutaneous Insulin Pump route as needed      lisinopril (ZESTRIL) 40 mg tablet Take 40 mg by mouth daily      metFORMIN (GLUCOPHAGE) 1000 MG tablet Take 1,000 mg by mouth 2 (two) times a day with meals      metoprolol succinate (TOPROL-XL) 50 mg 24 hr tablet Take 25 mg by mouth daily      Multiple Vitamin (MULTIVITAMIN) tablet Take 1 tablet by mouth daily      tamsulosin (FLOMAX) 0 4 mg Take 0 4 mg by mouth daily with dinner       No current facility-administered medications for this visit  Allergies   Allergen Reactions    Morphine GI Intolerance    Tetanus Toxoid Swelling    Tetanus-Diphth-Acell Pertussis [Tetanus-Diphth-Acell Pertussis] GI Intolerance       Review of Systems    Video Exam    There were no vitals filed for this visit  Physical Exam     I spent 4 hours of group + case management minutes with patient today in which greater than 50% of the time was spent in counseling/coordination of care regarding see notes  VIRTUAL VISIT DISCLAIMER    Karley Colindres acknowledges that he has consented to an online visit or consultation   He understands that the online visit is based solely on information provided by him, and that, in the absence of a face-to-face physical evaluation by the physician, the diagnosis he receives is both limited and provisional in terms of accuracy and completeness  This is not intended to replace a full medical face-to-face evaluation by the physician  Abigail Tyler understands and accepts these terms
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
## 2021-01-26 NOTE — PSYCH
This note was not shared with the patient due to this is a psychotherapy note   Subjective:     Patient ID: Todd Méndez is a 54 y o  male  [facility] Clinical Progress Notes      Specialized Services Documentation  Therapist must complete separate progress note for each specific clinical activity in which the individual participated during the day  GROUP PSYCHOTHERAPY (0930  1015) Group was facilitated virtually in a private office using HIPAA Compliant and Approved Microsoft Teams  Todd Méndez consented to the use of tele-video modality of treatment and was virtually present for group psychotherapy today  The group members received a safe and non-judgmental space to discuss current stressors and received feedback from the group  The group discussions revolved around personal disclosures, coping skills and positive hobbies, thinking errors and naming it to tame it  Radha Dyer was attentive in group but did not disclose during open discussion  Radha Dyer continues to make progress towards goals through verbal participation in group  Continue with psychotherapy  TX Plan Objectives: 1 1, 1 2, 1 4   Therapist: Mimi Garcia MA    Education Therapy   [id number] Todd Méndez actively shared in morning assessment and goal review  Presented as Receptive related to readiness to learn  Todd Méndez did complete goal from last treatment day identifying gaining mindfulness  did not present with any barriers to learning  Teresa engaged throughout the treatment day  Was engaged in learning related to Illness, Medication, Aftercare and Wellness Tools  Staff utilized Verbal, Written, A/V and Demonstration teaching methods  Todd Méndez shared area of learning and set a goal for outside of program to do laundry        Tx Plan Objective: 1 1, 1 2, 1 4 Therapist:  Mimi Garcia MA

## 2021-01-26 NOTE — PSYCH
This note was not shared with the patient due to this is a psychotherapy note      Subjective:     Patient ID: Yajaira Corrales is a 54 y o  male  [facility] Clinical Progress Notes      Specialized Services Documentation  Therapist must complete separate progress note for each specific clinical activity in which the individual participated during the day  This group was facilitated virtually in a private office using [employer] and Approved [employer] Teams  Yajaira Corrales consented to the use of tele-video modality of treatment  This note was not shared with the patient due to this is a psychotherapy note      Group Psychotherapy     ([id number]) Yajaira Corrales  was present in psychoeducational group which focused on nutrition to improve physical and mental wellbeing  Topics included appropriate serving sizes for all food groups as well as benefits to eating for health  Individuals were encouraged to think of ways they could make small changes to improve their overall health  Group learned statistics related to the benefits of healthy eating and it's link to mental health improvements  Lastly, they learned about vitamins and minerals proven to help with mental health  Good progress toward goal noted  Continue psychoeducation to educate on the importance of making nutrition a priority    Tx Plan Objective: 1 3, 1 4 Therapist:  Alexy Russ RN

## 2021-01-27 ENCOUNTER — OFFICE VISIT (OUTPATIENT)
Dept: PSYCHOLOGY | Facility: CLINIC | Age: 56
End: 2021-01-27
Payer: COMMERCIAL

## 2021-01-27 DIAGNOSIS — F33.2 MAJOR DEPRESSIVE DISORDER, RECURRENT SEVERE WITHOUT PSYCHOTIC FEATURES (HCC): Primary | ICD-10-CM

## 2021-01-27 PROCEDURE — S9480 INTENSIVE OUTPATIENT PSYCHIA: HCPCS

## 2021-01-27 NOTE — PSYCH
Virtual Regular Visit      Assessment/Plan:    Problem List Items Addressed This Visit        Other    Major depressive disorder, recurrent severe without psychotic features ([facility] [address] ) - Primary               Reason for visit is VIRTUAL PHP DUE TO COVID-19      Encounter provider BE [address]    Provider located at [address] [address]      Recent Visits  Date Type Provider Dept   01/26/21 [facility] Visit [facility]   01/25/21 [facility]   01/22/21 [facility]   01/21/21 [facility]   01/20/21 [facility]   Showing recent visits within past 7 days and meeting all other requirements     Future Appointments  No visits were found meeting these conditions  Showing future appointments within next 150 days and meeting all other requirements        The patient was identified by name and date of birth  Elnora Lundborg was informed that this is a telemedicine visit and that the visit is being conducted through [employer] and patient was informed that this is a secure, HIPAA-compliant platform  He agrees to proceed     My office door was closed  No one else was in the room  He acknowledged consent and understanding of privacy and security of the video platform  The patient has agreed to participate and understands they can discontinue the visit at any time  Patient is aware this is a billable service  Subjective  Elnora Lundborg is a 54 y o  male          HPI     Past Medical History:   Diagnosis Date    Anxiety     Depression     Diabetes mellitus ([facility] [address] )     Hypertension        Past Surgical History:   Procedure Laterality Date    BACK SURGERY         Current Outpatient Medications   Medication Sig Dispense Refill    amLODIPine (NORVASC) 5 mg tablet Take 5 mg by mouth daily      atorvastatin (LIPITOR) 40 mg tablet Take 40 mg by mouth daily      buPROPion (WELLBUTRIN XL) 300 mg 24 hr tablet Take 1 tablet (300 mg total) by mouth daily 30 tablet 0    DULoxetine (CYMBALTA) 60 mg delayed release capsule Take 60 mg by mouth daily      gabapentin (NEURONTIN) 600 MG tablet Take 600 mg by mouth 3 (three) times a day      hydrochlorothiazide (HYDRODIURIL) 25 mg tablet Take 25 mg by mouth daily      hydrOXYzine HCL (ATARAX) 50 mg tablet Take 50 mg by mouth daily as needed for anxiety Half to 1 tablet daily p r n  For severe anxiety      insulin lispro (HumaLOG) 100 units/mL by Subcutaneous Insulin Pump route as needed      lisinopril (ZESTRIL) 40 mg tablet Take 40 mg by mouth daily      metFORMIN (GLUCOPHAGE) 1000 MG tablet Take 1,000 mg by mouth 2 (two) times a day with meals      metoprolol succinate (TOPROL-XL) 50 mg 24 hr tablet Take 25 mg by mouth daily      Multiple Vitamin (MULTIVITAMIN) tablet Take 1 tablet by mouth daily      tamsulosin (FLOMAX) 0 4 mg Take 0 4 mg by mouth daily with dinner       No current facility-administered medications for this visit  Allergies   Allergen Reactions    Morphine GI Intolerance    Tetanus Toxoid Swelling    Tetanus-Diphth-Acell Pertussis [Tetanus-Diphth-Acell Pertussis] GI Intolerance       Review of Systems    Video Exam    There were no vitals filed for this visit  Physical Exam     I spent 4 hours of group + case management minutes with patient today in which greater than 50% of the time was spent in counseling/coordination of care regarding see notes  VIRTUAL VISIT DISCLAIMER    Caitlin Andrés acknowledges that he has consented to an online visit or consultation   He understands that the online visit is based solely on information provided by him, and that, in the absence of a face-to-face physical evaluation by the physician, the diagnosis he receives is both limited and provisional in terms of accuracy and completeness  This is not intended to replace a full medical face-to-face evaluation by the physician  eRy Mitchell understands and accepts these terms

## 2021-01-27 NOTE — PSYCH
This note was not shared with the patient due to this is a psychotherapy note  Subjective:     Patient ID: Sylvia Hassan is a 54 y o  male  [facility] Clinical Progress Notes      Specialized Services Documentation  Therapist must complete separate progress note for each specific clinical activity in which the individual participated during the day  Group Psychotherapy Life Skills Group (10:25-11:10) Tricia Marshall actively engaged in group focused on the empowerment triangle which was facilitated virtually in a private office using HIPAA Compliant and Approved "[employer] Teams  Tricia Marshall consented to the use of tele-video modality of treatment and was virtually present for group psychotherapy today  The group learned about the empowerment triangle and the roles of the creator, , and challenger  The group examined how to shift their mindset from their role(s) in the drama triangle to their role(s) in the Jean's  Tricia Marshall discussed how he would shift his mindset from his role in the drama triangle to his role in the empowerment triangle by advocating for himself, explaining things, and asking others for help  Tricia Marshall continues to make progress towards goals through verbal participation in group; to accomplish long term goals continue to utilize skills learned in programming  Continue with psychotherapy to educate and encourage use of wellness tools  Tx Plan Objective: 1 1,1 2 Therapist:  TIM Ventura

## 2021-01-27 NOTE — PSYCH
This note was not shared with the patient due to this is a psychotherapy note   Subjective:     Patient ID: Liat España is a 54 y o  male  [facility] Clinical Progress Notes      Specialized Services Documentation  Therapist must complete separate progress note for each specific clinical activity in which the individual participated during the day  Allied Therapy   This group was facilitated virtually in a private office using [employer] and Approved [employer] Teams  Liat España consented to the use of tele-video modality of treatment  [id number] Liat España actively shared in [facility] group focused on skill development and the concept of letting go and Radical Acceptance  Engaged in therapist led progressive muscle relaxation  During group discussion on letting go, Diandra Nascimento participated in group reading and was able to share relevant analogies  Group reinforced importance of consistently caring for ones self and use of strategies explored to refocus to the present  Some positive progress toward goal   Continue AT to increase skill awareness and use of skills consistently  Tx Plan Objective: 1 5, Therapist:  Brigette JERNIGAN        Case Management Note    Brigette JERNIGAN    Current suicide risk : Low     Not a case management day for Liat España  He was emailed to remind of meeting tomorrow and did not share any concerns or questions  Medications changes/added/denied? No    Treatment session number: 11 IOP 4    Individual Case Management Visit provided today?  No    [facility] follow up physician's orders: na

## 2021-01-27 NOTE — PSYCH
Subjective:     Patient ID: Wenceslao Vivar is a 54 y o  male  [facility] Clinical Progress Notes      Specialized Services Documentation  Therapist must complete separate progress note for each specific clinical activity in which the individual participated during the day  This group was facilitated virtually in a private office using [employer] and Approved [employer] Teams  Wenceslao Vivar consented to the use of tele-video modality of treatment  This note was not shared with the patient due to this is a psychotherapy note      Group Psychotherapy     (0627-0512) Wenceslao Vivar  was present in psychoeducational group which focused on taking responsibility for physical health and well being  Group members were paired up and encouraged to explore different categories of physical health which included:  weight, flexibility, skin, strength, eyes, nutrition, dental, foot care, and preventative care  They then discussed ideas on how to take responsibility for each physical aspect  Good progress toward goal noted  Continue psychotherapy to educate on the importance of making physical health a priority  Tx Plan Objective: 1 3 Therapist:  Mick Adams RN      Education Therapy   3351-2516 Wenceslao Vivar actively shared in morning assessment and goal review  Presented as Receptive related to readiness to learn  Wenceslao Vivar did complete goal from last treatment day identifying gaining  emotional wellness  did not present with any barriers to learning  Teresa engaged throughout the treatment day  Was engaged in learning related to [employer]  Staff utilized Verbal, Written, A/V, and Demonstration teaching methods  Wenceslao Vivar shared area of learning and set a goal for outside of program to go to psychotherapy        Tx Plan Objective: 1 4, Therapist:  Mick Adams RN This note was not shared with the patient due to this is a psychotherapy note

## 2021-01-28 ENCOUNTER — OFFICE VISIT (OUTPATIENT)
Dept: PSYCHOLOGY | Facility: CLINIC | Age: 56
End: 2021-01-28
Payer: COMMERCIAL

## 2021-01-28 DIAGNOSIS — F33.2 MAJOR DEPRESSIVE DISORDER, RECURRENT SEVERE WITHOUT PSYCHOTIC FEATURES (HCC): Primary | ICD-10-CM

## 2021-01-28 PROCEDURE — S9480 INTENSIVE OUTPATIENT PSYCHIA: HCPCS

## 2021-01-28 NOTE — PSYCH
This note was not shared with the patient due to this is a psychotherapy note   Subjective:     Patient ID: Angus Ribeiro is a 54 y o  male  [facility] Clinical Progress Notes      Specialized Services Documentation  Therapist must complete separate progress note for each specific clinical activity in which the individual participated during the day  GROUP PSYCHOTHERAPY (2251-9949) Group was facilitated virtually in a private office using HIPAA Compliant and Approved [employer] Teams  Angus Ribeiro consented to the use of tele-video modality of treatment and was virtually present for group psychotherapy today  Ryley Giles attentively listened to [facility] share his life story as he co-led this session  Group encouraged power of learning about self, accepting illness and personal responsibility in recovery  Community resources reviewed in addition to personal resources like the affirmations  Progress toward goals noted  Continue psychotherapy to encourage self -awareness and healthy engagement of supports      TX Plan Objectives: 1 1, 1 2, 1 4   Therapist: Charles Rodriguez MA

## 2021-01-28 NOTE — PSYCH
This note was not shared with the patient due to this is a psychotherapy note  Subjective:     Patient ID: Neeraj Turk is a 54 y o  male  [facility] Clinical Progress Notes      Specialized Services Documentation  Therapist must complete separate progress note for each specific clinical activity in which the individual participated during the day  Group Psychotherapy Life Skills Group (10:25-11:10) Severo Munson actively engaged in group focused on developing self compassion which was facilitated virtually in a private office using HIPAA Compliant and Approved [employer] Teams  Lilianatoan Raffaelemanjit consented to the use of tele-video modality of treatment and was virtually present for group psychotherapy today  The group learned about the importance of self compassion and how to bring self-compassion into their lives  The group examined why they feel bad about themselves and the emotions that come up for them  Severo Munson discussed the emotions of worthlessness that come up for him when he thinks about that aspect of his life  The group then explored what a friend who is unconditionally loving, accepting and compassionate would say to them about their perceived inadequacy  Severo Munson states that a friend like that would say to him that he needs to take one day at a time and that he needs open up and talk about his feelings  Severo Munson continues to make progress towards goals through verbal participation in group; to accomplish long term goals continue to utilize skills learned in programming  Continue with psychotherapy to educate and encourage use of wellness tools  Tx Plan Objective: 1 1,1 2 Therapist:  TIM Sidhu

## 2021-01-28 NOTE — PSYCH
This note was not shared with the patient due to this is a psychotherapy note       Subjective:     Patient ID: Antionette Goldberg is a 54 y o  male  [facility] Clinical Progress Notes      Specialized Services Documentation  Therapist must complete separate progress note for each specific clinical activity in which the individual participated during the day  Allied Therapy   This group was facilitated virtually in a private office using [employer] and Approved Microsoft Teams  Antionette Goldberg consented to the use of tele-video modality of treatment  [id number] Antionette Goldberg   actively shared in [facility] group focused on self- care  Roberta Severs was encouraged to identify aspects of self-care and barriers to it  The concept of self -care versus selfishness explored  Group reinforced the necessity of self -care in wellness versus seeing this as a luxury and tips to increase self-care  When asked at end of group a specific thing he could commit to in order to increase self-care, he stated will attend [facility] training event tonight  Some progress voiced toward goal   Continue AT to engage in the development and practice of wellness tools  Tx Plan Objective: 1 2,1 5, Therapist:  Toyin Nascimento [facility]    Education Therapy   [id number] Antionette Goldberg actively shared in morning assessment and goal review  Presented as Receptive related to readiness to learn  Antionette Goldberg did complete goal from last treatment day identifying gaining responsibility  did not present with any barriers to learning  Teresa engaged throughout the treatment day  Was engaged in learning related to Illness, Aftercare and Wellness Tools  Staff utilized Verbal, A/V and Demonstration teaching methods  Antionette Goldberg shared area of learning and set a goal for outside of program to go to the [facility]        Tx Plan Objective: 1 2, Therapist:  Toyin Nascimento MT-BC        Case Management Note    Toyin Nascimento MT-BC    Current suicide risk : Low     This case management session was facilitated virtually in a private office using HIPPA Compliant and Approved Microsoft Teams  Sivan Neves consented to the use of tele-video modality of treatment  [id number] Met with Sivan Neves  Reported that he was feeling better rested today  Able to share gains from day  Discharge planning discussed  Medications changes/added/denied? No    Treatment session number: 12 IOP 5  Individual Case Management Visit provided today?  Yes     [facility] follow up physician's orders: na

## 2021-01-28 NOTE — PSYCH
Virtual Regular Visit      Assessment/Plan:    Problem List Items Addressed This Visit        Other    Major depressive disorder, recurrent severe without psychotic features ([facility] [address] ) - Primary               Reason for visit is VIRTUAL PHP DUE TO COVID-19       Encounter provider BE [address]    Provider located at [address] [address]      Recent Visits  Date Type Provider Dept   01/27/21 Office Visit [facility]   01/26/21 Office Visit [facility]   01/25/21 Office Visit [facility]   01/22/21 [facility] Visit [facility]   01/21/21 [facility] Visit [facility]   Showing recent visits within past 7 days and meeting all other requirements     Today's Visits  Date Type Provider Dept   01/28/21 Office Visit [facility]   Showing today's visits and meeting all other requirements     Future Appointments  No visits were found meeting these conditions  Showing future appointments within next 150 days and meeting all other requirements        The patient was identified by name and date of birth  Jacey Goldman was informed that this is a telemedicine visit and that the visit is being conducted through [employer] and patient was informed that this is a secure, HIPAA-compliant platform  He agrees to proceed     My office door was closed  No one else was in the room  He acknowledged consent and understanding of privacy and security of the video platform  The patient has agreed to participate and understands they can discontinue the visit at any time  Patient is aware this is a billable service  Subjective  Jacey Goldman is a 54 y o  male          HPI     Past Medical History:   Diagnosis Date    Anxiety     Depression     Diabetes mellitus ([facility] [address] )     Hypertension        Past Surgical History:   Procedure Laterality Date    BACK SURGERY         Current Outpatient Medications   Medication Sig Dispense Refill    amLODIPine (NORVASC) 5 mg tablet Take 5 mg by mouth daily      atorvastatin (LIPITOR) 40 mg tablet Take 40 mg by mouth daily      buPROPion (WELLBUTRIN XL) 300 mg 24 hr tablet Take 1 tablet (300 mg total) by mouth daily 30 tablet 0    DULoxetine (CYMBALTA) 60 mg delayed release capsule Take 60 mg by mouth daily      gabapentin (NEURONTIN) 600 MG tablet Take 600 mg by mouth 3 (three) times a day      hydrochlorothiazide (HYDRODIURIL) 25 mg tablet Take 25 mg by mouth daily      hydrOXYzine HCL (ATARAX) 50 mg tablet Take 50 mg by mouth daily as needed for anxiety Half to 1 tablet daily p r n  For severe anxiety      insulin lispro (HumaLOG) 100 units/mL by Subcutaneous Insulin Pump route as needed      lisinopril (ZESTRIL) 40 mg tablet Take 40 mg by mouth daily      metFORMIN (GLUCOPHAGE) 1000 MG tablet Take 1,000 mg by mouth 2 (two) times a day with meals      metoprolol succinate (TOPROL-XL) 50 mg 24 hr tablet Take 25 mg by mouth daily      Multiple Vitamin (MULTIVITAMIN) tablet Take 1 tablet by mouth daily      tamsulosin (FLOMAX) 0 4 mg Take 0 4 mg by mouth daily with dinner       No current facility-administered medications for this visit  Allergies   Allergen Reactions    Morphine GI Intolerance    Tetanus Toxoid Swelling    Tetanus-Diphth-Acell Pertussis [Tetanus-Diphth-Acell Pertussis] GI Intolerance       Review of Systems    Video Exam    There were no vitals filed for this visit  Physical Exam     I spent 4 hours of group + case management minutes with patient today in which greater than 50% of the time was spent in counseling/coordination of care regarding see notes  VIRTUAL VISIT DISCLAIMER    Tri Goyal acknowledges that he has consented to an online visit or consultation   He understands that the online visit is based solely on information provided by him, and that, in the absence of a face-to-face physical evaluation by the physician, the diagnosis he receives is both limited and provisional in terms of accuracy and completeness  This is not intended to replace a full medical face-to-face evaluation by the physician  Mercedes Mallory understands and accepts these terms

## 2021-01-29 ENCOUNTER — OFFICE VISIT (OUTPATIENT)
Dept: PSYCHOLOGY | Facility: CLINIC | Age: 56
End: 2021-01-29
Payer: COMMERCIAL

## 2021-01-29 DIAGNOSIS — F33.2 MAJOR DEPRESSIVE DISORDER, RECURRENT SEVERE WITHOUT PSYCHOTIC FEATURES (HCC): Primary | ICD-10-CM

## 2021-01-29 PROCEDURE — S9480 INTENSIVE OUTPATIENT PSYCHIA: HCPCS

## 2021-01-29 PROCEDURE — 90832 PSYTX W PT 30 MINUTES: CPT

## 2021-01-29 PROCEDURE — S0201 PARTIAL HOSPITALIZATION SERV: HCPCS

## 2021-01-29 NOTE — PSYCH
This note was not shared with the patient due to this is a psychotherapy note   Subjective:     Patient ID: Sylvia Hassan is a 54 y o  male  [facility] Clinical Progress Notes      Specialized Services Documentation  Therapist must complete separate progress note for each specific clinical activity in which the individual participated during the day  GROUP PSYCHOTHERAPY ([id number]) Group was facilitated virtually in a private office using HIPAA Compliant and Approved [employer] Teams  Sylvia Hassan consented to the use of tele-video modality of treatment and was virtually present for group psychotherapy today  The group members received a safe and non-judgmental space to discuss current stressors and received feedback from the group  The group discussions revolved around personal disclosures, judgements and negative self-talk  Kojo asked questions to gain understanding of other member's situations to better support and empathize  Aiken continues to make progress towards goals through verbal participation in group  Continue with psychotherapy  TX Plan Objectives: 1 1, 1 2, 1 4   Therapist: Jeromy Bender MA      Education Therapy   [id number] Sylvia Hassan actively shared in morning assessment and goal review  Presented as Receptive related to readiness to learn  Sylvia Hassan did complete goal from last treatment day identifying gaining accomplishment  did not present with any barriers to learning  Teresa engaged throughout the treatment day  Was engaged in learning related to Illness, Medication, Aftercare and Wellness Tools  Staff utilized Verbal, Written, A/V and Demonstration teaching methods  Sylvia Hassan shared area of learning and set a goal for outside of program to get things done around the house and communicate better with wife        Tx Plan Objective: 1 1, 1 2, 1 4 Therapist:  Jeromy Bender MA

## 2021-01-29 NOTE — PSYCH
This note was not shared with the patient due to this is a psychotherapy note  Subjective:     Patient ID: Mercedes Mallory is a 54 y o  male  [facility] Clinical Progress Notes      Specialized Services Documentation  Therapist must complete separate progress note for each specific clinical activity in which the individual participated during the day  Group PsychotherapyLife Skills Group (10:25-11:10) Reginaldo Bustos actively engaged in group focused on how to improve self-compassion which was facilitated virtually in a private office using HIPAA Compliant and Approved [employer] Teams  Reginaldo Bustos consented to the use of tele-video modality of treatment and was virtually present for group psychotherapy today  The group was a continuation of yesterdays group which learned about the importance of self compassion and how to bring self-compassion into their lives  [address] group examined possible changes they could make and discussed one compassionate thought that would comfort them  Reginaldo Bustos discussed making a change by looking into changing his path within 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

## 2021-01-29 NOTE — PSYCH
This note was not shared with the patient due to this is a psychotherapy note    Subjective:     Patient ID: Effie De León is a 54 y o  male  [facility] Clinical Progress Notes      Specialized Services Documentation  Therapist must complete separate progress note for each specific clinical activity in which the individual participated during the day  Group Psychotherapy     ([id number]) Effie De León actively shared in psychotherapy group which focused on Weekly Wellness Assessment  Theyengaged in self-rate and discussion  Group members individually went through the assessment and rated themselves based on the past week  Group members shared assessment results  Group discussed the six domains of wellness; physical, emotional, cognitive, vocational, social, and spiritual  Group discussed strengths, challenges, barriers, and ways to increase each domain in and open forum and developed goals  Good progress towards goal observed and shared  Continue psychotherapy to further encourage self-reflection on strengths and challenges with personal wellness      Tx Plan Objective:  1 1,1 2,1 3,1 4 Therapist:  Olivier Pettit RN

## 2021-01-29 NOTE — PSYCH
This note was not shared with the patient due to this is a psychotherapy note       Subjective:     Patient ID: Rey Mitchell is a 54 y o  male  [facility] Clinical Progress Notes      Specialized Services Documentation  Therapist must complete separate progress note for each specific clinical activity in which the individual participated during the day  Case Management Note    Charmaine Matson MT-BC    Current suicide risk : Low     1350 - 1410 Met with Rey Mitchell to discuss weekend plans and supports  Rey Mitchell identified that he just a had a call with his boss that he felt positive about  Discussed days for next week and anticipated length of stay  Goals for next week include reduction to 3 days and med check - reviewed date and time  He presented as more hopeful  Medications changes/added/denied? No    Treatment session number: 13 IOP 6    Individual Case Management Visit provided today?  Yes     [facility] follow up physician's orders: na

## 2021-02-01 ENCOUNTER — TELEMEDICINE (OUTPATIENT)
Dept: PSYCHIATRY | Facility: CLINIC | Age: 56
End: 2021-02-01
Payer: COMMERCIAL

## 2021-02-01 ENCOUNTER — OFFICE VISIT (OUTPATIENT)
Dept: PSYCHOLOGY | Facility: CLINIC | Age: 56
End: 2021-02-01
Payer: COMMERCIAL

## 2021-02-01 DIAGNOSIS — F33.2 MAJOR DEPRESSIVE DISORDER, RECURRENT SEVERE WITHOUT PSYCHOTIC FEATURES (HCC): ICD-10-CM

## 2021-02-01 DIAGNOSIS — F33.2 MAJOR DEPRESSIVE DISORDER, RECURRENT SEVERE WITHOUT PSYCHOTIC FEATURES (HCC): Primary | ICD-10-CM

## 2021-02-01 PROCEDURE — S9480 INTENSIVE OUTPATIENT PSYCHIA: HCPCS

## 2021-02-01 PROCEDURE — 90833 PSYTX W PT W E/M 30 MIN: CPT | Performed by: NURSE PRACTITIONER

## 2021-02-01 PROCEDURE — 99213 OFFICE O/P EST LOW 20 MIN: CPT | Performed by: NURSE PRACTITIONER

## 2021-02-01 PROCEDURE — 1036F TOBACCO NON-USER: CPT | Performed by: NURSE PRACTITIONER

## 2021-02-01 RX ORDER — BUPROPION HYDROCHLORIDE 300 MG/1
300 TABLET ORAL DAILY
Qty: 30 TABLET | Refills: 1 | Status: SHIPPED | OUTPATIENT
Start: 2021-02-01 | End: 2021-03-02 | Stop reason: SDUPTHER

## 2021-02-01 NOTE — PSYCH
This note was not shared with the patient due to this is a psychotherapy note       Subjective:     Patient ID: Sylvain Jackson is a 54 y o  male  [facility] Clinical Progress Notes      Specialized Services Documentation  Therapist must complete separate progress note for each specific clinical activity in which the individual participated during the day  Allied Therapy   This group was facilitated virtually in a private office using [employer] and Approved [employer] Teams  Sylvain Jackson consented to the use of tele-video modality of treatment  [id number]  Sylvain Jackson  actively shared in [facility] group exploring DBT distress tolerance crisis survival strategies  Group explored crisis survival strategies ACCEPTS, TIP, STOP, and PROS & CONS) reinforcing actions one could take to learn to tolerate stressful experiences, thoughts and urges  Mathieu Reeves felt he could put effort into practicing "with other THOUGHTS" from [employer] ACCEPTS  Some progress toward goal noted  Continue AT to encourage learning, practice and home practice of skills to manage distress  Tx Plan Objective: 1 1,1 5, Therapist:  Vamsi JERNIGAN    Education Therapy   [id number] Sylvain Jackson actively shared in morning assessment and goal review  Presented as Receptive related to readiness to learn  Sylvain Jackson did complete goal from last treatment day identifying gaining responsibility  did not present with any barriers to learning  Teresa engaged throughout the treatment day  Was engaged in learning related to Illness, Medication, Aftercare and Wellness Tools  Staff utilized Verbal, A/V and Demonstration teaching methods  Sylvain Jackson shared area of learning and set a goal for outside of program to use distress tolerance skills        Tx Plan Objective: 1 1,1 5, Therapist:  Vamsi JERNIGAN        Case Management Note    Vamsi JERNIGAN    Current suicide risk : Low     INDIVIDUAL PSYCHOTHERAPY  This case management session was facilitated virtually in a private office using [employer] and Approved [employer] Teams  Coby Cooper consented to the use of tele-video modality of treatment  [id number] Met with Coby Kenneth  Reported that he had a "pretty good weekend"  He is increasingly aware that over the last year he was struggled with fear of "ending up alone"  He is able to acknowledge this is irrational, however he needs to work on challenging this thought     Progress noted in that mood is more consistently improved  Barriers continue to be isolation Reviewed aftercare plan and anticipated discharge  Medications changes/added/denied? No    Treatment session number: 14 IOP 7  Individual Case Management Visit provided today? Yes     [facility] follow up physician's orders: see S   Trever note

## 2021-02-01 NOTE — PSYCH
This note was not shared with the patient due to this is a psychotherapy note    Subjective:     Patient ID: Abigail Tyler is a 54 y o  male  [facility] Clinical Progress Notes      Specialized Services Documentation  Therapist must complete separate progress note for each specific clinical activity in which the individual participated during the day  Group Psychotherapy [id number]    This group was facilitated virtually in a private office using [employer] and Approved [employer] Teams  Abigail Tyler consented to the use of tele-video modality of treatment  Psychotherapy group focused on building self esteem  Abigail Tyler was educated on the correlation between low self esteem and anxiety, depression, poor relationships, and addiction  Different wellness tools to improve self esteem were reviewed includin) managing your inner critic  2) focusing on what is going well for you  3) aiming for effort rather than perfection  4) viewing mistakes as learning opportunities  5) editing  thoughts that get you to feel inferior  6) reminding yourself that everyone excels at different things  7) trying new things and giving yourself credit  8) recognizing what you can change and what you cant  9) setting goals  10)  taking pride in your opinions and ideas  11)  accepting compliments  12)  making a contribution  Abigail Tyler was provided a list of positive affirmations to begin incorporating into his day to day routine  Through group discussion, Abigail Tyler did not share one way he can begin improving his self esteem  Abigail Tyler did not appear interested in the topic and did not participate in the discussion  No progress toward goals  Abigail Tyler is encouraged to continue to make progress towards goals and objectives through group participation and will continue to attend psychotherapy group         Tx Plan Objective: 1 1,1 2, Therapist:  TIM Parnell

## 2021-02-01 NOTE — PSYCH
Virtual Regular Visit      Assessment/Plan:    Problem List Items Addressed This Visit        Other    Major depressive disorder, recurrent severe without psychotic features ([facility] [address] ) - Primary               Reason for visit is VIRTUAL PHP DUE TO COVID-19       Encounter provider BE [address]    Provider located at [address] [address]      Recent Visits  Date Type Provider Dept   01/29/21 [facility]   01/28/21 [facility] Visit [facility]   01/27/21 [facility] Visit [facility]   01/26/21 [facility]   01/25/21 [facility] Visit [facility]   Showing recent visits within past 7 days and meeting all other requirements     Today's Visits  Date Type Provider Dept   02/01/21 Office Visit [facility]   Showing today's visits and meeting all other requirements     Future Appointments  No visits were found meeting these conditions  Showing future appointments within next 150 days and meeting all other requirements        The patient was identified by name and date of birth  Coby Cooper was informed that this is a telemedicine visit and that the visit is being conducted through [employer] and patient was informed that this is a secure, HIPAA-compliant platform  He agrees to proceed     My office door was closed  No one else was in the room  He acknowledged consent and understanding of privacy and security of the video platform  The patient has agreed to participate and understands they can discontinue the visit at any time  Patient is aware this is a billable service  Subjective  Coby Cooper is a 54 y o  male          HPI     Past Medical History:   Diagnosis Date    Anxiety     Depression     Diabetes mellitus ([facility] [address] )     Hypertension        Past Surgical History:   Procedure Laterality Date    BACK SURGERY         Current Outpatient Medications   Medication Sig Dispense Refill    amLODIPine (NORVASC) 5 mg tablet Take 5 mg by mouth daily      atorvastatin (LIPITOR) 40 mg tablet Take 40 mg by mouth daily      buPROPion (WELLBUTRIN XL) 300 mg 24 hr tablet Take 1 tablet (300 mg total) by mouth daily 30 tablet 1    DULoxetine (CYMBALTA) 60 mg delayed release capsule Take 60 mg by mouth daily      gabapentin (NEURONTIN) 600 MG tablet Take 600 mg by mouth 3 (three) times a day      hydrochlorothiazide (HYDRODIURIL) 25 mg tablet Take 25 mg by mouth daily      hydrOXYzine HCL (ATARAX) 50 mg tablet Take 50 mg by mouth daily as needed for anxiety Half to 1 tablet daily p r n  For severe anxiety      insulin lispro (HumaLOG) 100 units/mL by Subcutaneous Insulin Pump route as needed      lisinopril (ZESTRIL) 40 mg tablet Take 40 mg by mouth daily      metFORMIN (GLUCOPHAGE) 1000 MG tablet Take 1,000 mg by mouth 2 (two) times a day with meals      metoprolol succinate (TOPROL-XL) 50 mg 24 hr tablet Take 25 mg by mouth daily      Multiple Vitamin (MULTIVITAMIN) tablet Take 1 tablet by mouth daily      tamsulosin (FLOMAX) 0 4 mg Take 0 4 mg by mouth daily with dinner       No current facility-administered medications for this visit  Allergies   Allergen Reactions    Morphine GI Intolerance    Tetanus Toxoid Swelling    Tetanus-Diphth-Acell Pertussis [Tetanus-Diphth-Acell Pertussis] GI Intolerance       Review of Systems    Video Exam    There were no vitals filed for this visit  Physical Exam     I spent 4 hours of group + case management minutes with patient today in which greater than 50% of the time was spent in counseling/coordination of care regarding see notes  VIRTUAL VISIT DISCLAIMER    Sylvain Jackson acknowledges that he has consented to an online visit or consultation   He understands that the online visit is based solely on information provided by him, and that, in the absence of a face-to-face physical evaluation by the physician, the diagnosis he receives is both limited and provisional in terms of accuracy and completeness  This is not intended to replace a full medical face-to-face evaluation by the physician  Abigail Tyler understands and accepts these terms

## 2021-02-01 NOTE — PSYCH
Virtual Regular Visit    Problem List Items Addressed This Visit        Other    Major depressive disorder, recurrent severe without psychotic features ([facility] [address] )    Relevant Medications    buPROPion (WELLBUTRIN XL) 300 mg 24 hr tablet             Encounter provider NHAN Branham    Provider located at   [address]  [address]  [facility] [id number]    Recent Visits  No visits were found meeting these conditions  Showing recent visits within past 7 days and meeting all other requirements     Today's Visits  Date Type Provider Dept   02/01/21 Telemedicine NHAN Maiu Jolly 18 today's visits and meeting all other requirements     Future Appointments  No visits were found meeting these conditions  Showing future appointments within next 150 days and meeting all other requirements      The patient was identified by name and date of birth  Todd Méndez was informed that this is a telemedicine visit and that the visit is being conducted through [employer]  My office door was closed  No one else was in the room  He acknowledged consent and understanding of privacy and security of the video platform  The patient has agreed to participate and understands they can discontinue the visit at any time  Patient is aware this is a billable service       HPI     Current Outpatient Medications   Medication Sig Dispense Refill    amLODIPine (NORVASC) 5 mg tablet Take 5 mg by mouth daily      atorvastatin (LIPITOR) 40 mg tablet Take 40 mg by mouth daily      buPROPion (WELLBUTRIN XL) 300 mg 24 hr tablet Take 1 tablet (300 mg total) by mouth daily 30 tablet 1    DULoxetine (CYMBALTA) 60 mg delayed release capsule Take 60 mg by mouth daily      gabapentin (NEURONTIN) 600 MG tablet Take 600 mg by mouth 3 (three) times a day      hydrochlorothiazide (HYDRODIURIL) 25 mg tablet Take 25 mg by mouth daily      hydrOXYzine HCL (ATARAX) 50 mg tablet Take 50 mg by mouth daily as needed for anxiety Half to 1 tablet daily p r n  For severe anxiety      insulin lispro (HumaLOG) 100 units/mL by Subcutaneous Insulin Pump route as needed      lisinopril (ZESTRIL) 40 mg tablet Take 40 mg by mouth daily      metFORMIN (GLUCOPHAGE) 1000 MG tablet Take 1,000 mg by mouth 2 (two) times a day with meals      metoprolol succinate (TOPROL-XL) 50 mg 24 hr tablet Take 25 mg by mouth daily      Multiple Vitamin (MULTIVITAMIN) tablet Take 1 tablet by mouth daily      tamsulosin (FLOMAX) 0 4 mg Take 0 4 mg by mouth daily with dinner       No current facility-administered medications for this visit  Review of Systems  Video Exam    There were no vitals filed for this visit  Physical Exam   As a result of this visit, I have referred the patient for further respiratory evaluation  No    I spent 15 minutes directly with the patient during this visit  VIRTUAL VISIT DISCLAIMER    Abhijeet Greene acknowledges that he has consented to an online visit or consultation  He understands that the online visit is based solely on information provided by him, and that, in the absence of a face-to-face physical evaluation by the physician, the diagnosis he receives is both limited and provisional in terms of accuracy and completeness  This is not intended to replace a full medical face-to-face evaluation by the physician  Cristhianjohanne Ramona understands and accepts these terms  PHP MEDICATION MANAGEMENT NOTE        [address]    Name and Date of Birth:  Abhijeet Greene 54 y o  1965 MRN: [id number]    Date of Visit: February 1, 2021    Allergies   Allergen Reactions    Morphine GI Intolerance    Tetanus Toxoid Swelling    Tetanus-Diphth-Acell Pertussis [Tetanus-Diphth-Acell Pertussis] GI Intolerance     SUBJECTIVE:    Seble Mendes is seen today for a follow up for depression and anxiety   He reports that he has done fairly well since beginning PHP  Patient states that depression and anxiety have significantly improved  Patient continues to feel anxious in public sit settings where social distance or other COVID precautions are not being closely followed  Patient is scheduled outpatient Psychiatry and individual therapy follow-up  He denies any side effects from medications  PLAN:   continue current psychiatric medications as ordered   adequate supply of medication until outpatient follow-up  Aware of 24 hour and weekend coverage for urgent situations accessed by calling [facility] main practice number  Continue partial hospitalization program    Diagnoses and all orders for this visit:    Major depressive disorder, recurrent severe without psychotic features (HCC)  -     buPROPion (WELLBUTRIN XL) 300 mg 24 hr tablet; Take 1 tablet (300 mg total) by mouth daily        Current Outpatient Medications on File Prior to Visit   Medication Sig Dispense Refill    amLODIPine (NORVASC) 5 mg tablet Take 5 mg by mouth daily      atorvastatin (LIPITOR) 40 mg tablet Take 40 mg by mouth daily      DULoxetine (CYMBALTA) 60 mg delayed release capsule Take 60 mg by mouth daily      gabapentin (NEURONTIN) 600 MG tablet Take 600 mg by mouth 3 (three) times a day      hydrochlorothiazide (HYDRODIURIL) 25 mg tablet Take 25 mg by mouth daily      hydrOXYzine HCL (ATARAX) 50 mg tablet Take 50 mg by mouth daily as needed for anxiety Half to 1 tablet daily p r n   For severe anxiety      insulin lispro (HumaLOG) 100 units/mL by Subcutaneous Insulin Pump route as needed      lisinopril (ZESTRIL) 40 mg tablet Take 40 mg by mouth daily      metFORMIN (GLUCOPHAGE) 1000 MG tablet Take 1,000 mg by mouth 2 (two) times a day with meals      metoprolol succinate (TOPROL-XL) 50 mg 24 hr tablet Take 25 mg by mouth daily      Multiple Vitamin (MULTIVITAMIN) tablet Take 1 tablet by mouth daily      tamsulosin (FLOMAX) 0 4 mg Take 0 4 mg by mouth daily with dinner      [DISCONTINUED] buPROPion (WELLBUTRIN XL) 300 mg 24 hr tablet Take 1 tablet (300 mg total) by mouth daily 30 tablet 0     No current facility-administered medications on file prior to visit  Psychotherapy Provided:     Individual psychotherapy provided: Yes  Counseling was provided during the session today for 16 minutes  Supportive counseling provided  Discussed rational versus irrational fears  Identifying cognitive distortions  HPI ROS Appetite Changes and Sleep:     He reports adequate number of sleep hours (6-8 hours), adequate appetite, adequate energy level   Patient denies suicidal or homicidal ideation    Review Of Systems:      General emotional problems and decreased functioning   Personality no change in personality   Constitutional negative   ENT negative   Cardiovascular negative   Respiratory negative   Gastrointestinal negative   Genitourinary negative   Musculoskeletal negative   Integumentary negative   Neurological negative   Endocrine negative   Other Symptoms none, all other systems are negative     Mental Status Evaluation:    Appearance Adequate hygiene and grooming   Behavior cooperative   Mood anxious  Depression Scale -  of 10 (0 = No depression)  Anxiety Scale -  of 10 (0 = No anxiety)   Speech Normal rate and volume   Affect appropriate and mood-congruent   Thought Processes Goal directed and coherent   Thought Content Does not verbalize delusional material   Associations Tightly connected   Perceptual Disturbances Denies hallucinations and does not appear to be responding to internal stimuli   Risk Potential Suicidal/Homicidal Ideation - No evidence of suicidal or homicidal ideation and Patient does not verbalize suicidal or homicidal ideation  Risk of Violence - No evidence of risk for violence found on assessment  Risk of Self Mutilation - No evidence of risk for self mutilation found on assessment   Orientation oriented to person, place, time/date and situation   Memory recent and remote memory grossly intact   Consciousness alert and awake   Attention/Concentration attention span and concentration are age appropriate   Insight fair and improving   Judgement fair   Muscle Strength and Gait normal muscle strength and normal muscle tone, normal gait/station and normal balance   Motor Activity no abnormal movements   Language no difficulty naming common objects, no difficulty repeating a phrase, no difficulty writing a sentence   Fund of Knowledge adequate knowledge of current events  adequate fund of knowledge regarding past history  adequate fund of knowledge regarding vocabulary      Past Psychiatric History Update:     Inpatient Psychiatric Admission Since Last Encounter:   no  Suicide Attempt Or Self Mutilation Since Last Encounter:   no  Incidence of Violent Behavior Since Last Encounter:   no    Traumatic History Update:     New Onset of Abuse Since Last Encounter:   no  Traumatic Events Since Last Encounter:   no    Past Medical History:    Past Medical History:   Diagnosis Date    Anxiety     Depression     Diabetes mellitus ([place] [address] )     Hypertension      Past Medical History Pertinent Negatives:   Diagnosis Date Noted    Head injury 05/13/2020    Seizures ([place] [address] ) 05/13/2020     Past Surgical History:   Procedure Laterality Date    BACK SURGERY       Allergies   Allergen Reactions    Morphine GI Intolerance    Tetanus Toxoid Swelling    Tetanus-Diphth-Acell Pertussis [Tetanus-Diphth-Acell Pertussis] GI Intolerance     Substance Abuse History:    Social History     Substance and Sexual Activity   Alcohol Use Yes    Comment: occasional     Social History     Substance and Sexual Activity   Drug Use No     Social History:    Social History     Socioeconomic History    Marital status: /Civil Union     Spouse name: Not on file    Number of children: 2    Years of education: Not on file    Highest education level: Associate degree: occupational, technical, or vocational program   Occupational History     Employer: [employer] resource strain: Not on file    Food insecurity     Worry: Not on file     Inability: Not on file   [place] [employer] needs     Medical: Not on file     Non-medical: Not on file   Tobacco Use    Smoking status: Current Some Day Smoker    Smokeless tobacco: Never Used   Substance and Sexual Activity    Alcohol use: Yes     Comment: occasional    Drug use: No    Sexual activity: Not on file   Lifestyle    Physical activity     Days per week: 7 days     Minutes per session: 30 min    Stress: Rather much   Relationships    Social connections     Talks on phone: Not on file     Gets together: Not on file     Attends Church service: Not on file     Active member of club or organization: Not on file     Attends meetings of clubs or organizations: Not on file     Relationship status: Not on file    Intimate partner violence     Fear of current or ex partner: No     Emotionally abused: No     Physically abused: Not on file     Forced sexual activity: No   Other Topics Concern    Not on file   Social History Narrative    Not on file     Family Psychiatric History:     Family History   Problem Relation Age of Onset    Stroke Mother     Psychiatric Illness Neg Hx      History Review: The following portions of the patient's history were reviewed and updated as appropriate: allergies, current medications, past family history, past medical history, past social history, past surgical history and problem list     OBJECTIVE:     Vital signs in last 24 hours: There were no vitals filed for this visit  Laboratory Results: I have personally reviewed all pertinent laboratory/tests results      Medications Risks/Benefits:      Risks, Benefits And Possible Side Effects Of Medications:    Discussed risks and benefits of treatment with patient including risk of suicidality, serotonin syndrome and SIADH related to treatment with antidepressants; Risk of induction of manic symptoms in certain patient populations     Controlled Medication Discussion:     Not applicable    NHAN Neely 02/01/21    This note was shared with patient

## 2021-02-01 NOTE — PSYCH
This note was not shared with the patient due to this is a psychotherapy note   Subjective:     Patient ID: Mercedes Mallory is a 54 y o  male  [facility] Clinical Progress Notes      Specialized Services Documentation  Therapist must complete separate progress note for each specific clinical activity in which the individual participated during the day  GROUP PSYCHOTHERAPY ([id number]) Group was facilitated virtually in a private office using HIPAA Compliant and Approved [employer] Teams  Mercedes Mallory consented to the use of tele-video modality of treatment and was virtually present for group psychotherapy today  The group members received a safe and non-judgmental space to discuss current stressors and received feedback from the group  The group discussions reviewing and organizing skills and effective communication in positive relationships  The group was educated on Non-Violent Communication  Reginaldo Bustos was attentive in group but did not disclose during open discussion  Reginaldo Akash continues to make progress towards goals through verbal participation in group  Continue with psychotherapy     TX Plan Objectives: 1 1, 1 2, 1 4   Therapist: Claudell Baars, MA

## 2021-02-02 ENCOUNTER — APPOINTMENT (OUTPATIENT)
Dept: PSYCHOLOGY | Facility: CLINIC | Age: 56
End: 2021-02-02
Payer: COMMERCIAL

## 2021-02-02 ENCOUNTER — DOCUMENTATION (OUTPATIENT)
Dept: PSYCHOLOGY | Facility: CLINIC | Age: 56
End: 2021-02-02

## 2021-02-02 NOTE — PROGRESS NOTES
This note was not shared with the patient due to this is a psychotherapy note   Subjective:     Patient ID: Todd Méndez is a 54 y o  male  [facility] Clinical Progress Notes      Specialized Services Documentation  Therapist must complete separate progress note for each specific clinical activity in which the individual participated during the day  Case Management Note    Margi JERNIGAN    Current suicide risk : Low     0932 Excused due to IOP status  Medications changes/added/denied? No    Treatment session number: na    Individual Case Management Visit provided today?  No    [facility] follow up physician's orders: na

## 2021-02-03 ENCOUNTER — OFFICE VISIT (OUTPATIENT)
Dept: PSYCHOLOGY | Facility: CLINIC | Age: 56
End: 2021-02-03
Payer: COMMERCIAL

## 2021-02-03 DIAGNOSIS — F33.2 MAJOR DEPRESSIVE DISORDER, RECURRENT SEVERE WITHOUT PSYCHOTIC FEATURES (HCC): Primary | ICD-10-CM

## 2021-02-03 PROCEDURE — S9480 INTENSIVE OUTPATIENT PSYCHIA: HCPCS

## 2021-02-03 NOTE — PSYCH
This note was not shared with the patient due to this is a psychotherapy note  Subjective:     Patient ID: Jacey Goldman is a 54 y o  male  [facility] Clinical Progress Notes      Specialized Services Documentation  Therapist must complete separate progress note for each specific clinical activity in which the individual participated during the day  Group Psychotherapy Life Skills Group (10:25-11:10) Kojo actively engaged in group focused on understanding what you can control in your life which was facilitated virtually in a private office using HIPAA Compliant and Approved "[employer] Teams  Kojo consented to the use of tele-video modality of treatment and was virtually present for group psychotherapy today  The objective of group was to increase the sense of control that clients have in their lives  Clients where asked to rate statements about the control in their lives on a scale of 1-10 as to whether or not that applied to them  Scores above a 50 indicated that the person feels like they have little control in their life  Kojo stated that he scored a 28  Clients completed two activities and were asked to reflect on whether or not they believed it increased their sense of control or changed the way they viewed experiences  Kojo stated that he believes his sense of control increased   Kojo continues to make progress towards goals through verbal participation in group; to accomplish long term goals continue to utilize skills learned in programming  Continue with psychotherapy to educate and encourage use of wellness tools  Tx Plan Objective: 1 1,1 2 Therapist:  TIM Rider

## 2021-02-03 NOTE — PSYCH
Subjective:     Patient ID: Dakota Taylor is a 54 y o  male  [facility] Clinical Progress Notes      Specialized Services Documentation  Therapist must complete separate progress note for each specific clinical activity in which the individual participated during the day  This group was facilitated virtually in a private office using [employer] and Approved [employer] Teams  Dakota Taylor consented to the use of tele-video modality of treatment  This note was not shared with the patient due to this is a psychotherapy note      Group Psychotherapy     ([id number]) Dakota Taylor  was present in psychoeducational group which focused on sleep hygiene  Group shared current barriers contributing to decreased quality of sleep  He did not identify a barrier  This writer explained the importance of quality sleep in relation to wellness  Additional tips on sleep hygiene were discussed  Dakota Taylor appeared to be engaged in group, but did not participate until prompted  Some slow progress toward goal observed  Continue psychoeducation group to increase awareness of good sleeping habits to promote wellness           Tx Plan Objective: 1 3 Therapist:  Dalton Schmitt RN

## 2021-02-03 NOTE — PSYCH
Virtual Regular Visit      Assessment/Plan:    Problem List Items Addressed This Visit        Other    Major depressive disorder, recurrent severe without psychotic features ([facility] [address] ) - Primary               Reason for visit is VIRTUAL PHP DUE TO COVID-19       Encounter provider BE [address]    Provider located at [address] [address]      Recent Visits  Date Type Provider Dept   02/01/21 Office Visit [facility]   01/29/21 Office Visit [facility]   01/28/21 Office Visit [facility]   01/27/21 Office Visit [facility]   Showing recent visits within past 7 days and meeting all other requirements     Today's Visits  Date Type Provider Dept   02/03/21 Office Visit [facility]   Showing today's visits and meeting all other requirements     Future Appointments  No visits were found meeting these conditions  Showing future appointments within next 150 days and meeting all other requirements        The patient was identified by name and date of birth  Angus Ribeiro was informed that this is a telemedicine visit and that the visit is being conducted through [employer] and patient was informed that this is a secure, HIPAA-compliant platform  He agrees to proceed     My office door was closed  No one else was in the room  He acknowledged consent and understanding of privacy and security of the video platform  The patient has agreed to participate and understands they can discontinue the visit at any time  Patient is aware this is a billable service  Subjective  Angus Ribeiro is a 54 y o  male          HPI     Past Medical History:   Diagnosis Date    Anxiety     Depression     Diabetes mellitus ([facility] [address] )     Hypertension        Past Surgical History:   Procedure Laterality Date    BACK SURGERY         Current Outpatient Medications   Medication Sig Dispense Refill    amLODIPine (NORVASC) 5 mg tablet Take 5 mg by mouth daily      atorvastatin (LIPITOR) 40 mg tablet Take 40 mg by mouth daily      buPROPion (WELLBUTRIN XL) 300 mg 24 hr tablet Take 1 tablet (300 mg total) by mouth daily 30 tablet 1    DULoxetine (CYMBALTA) 60 mg delayed release capsule Take 60 mg by mouth daily      gabapentin (NEURONTIN) 600 MG tablet Take 600 mg by mouth 3 (three) times a day      hydrochlorothiazide (HYDRODIURIL) 25 mg tablet Take 25 mg by mouth daily      hydrOXYzine HCL (ATARAX) 50 mg tablet Take 50 mg by mouth daily as needed for anxiety Half to 1 tablet daily p r n  For severe anxiety      insulin lispro (HumaLOG) 100 units/mL by Subcutaneous Insulin Pump route as needed      lisinopril (ZESTRIL) 40 mg tablet Take 40 mg by mouth daily      metFORMIN (GLUCOPHAGE) 1000 MG tablet Take 1,000 mg by mouth 2 (two) times a day with meals      metoprolol succinate (TOPROL-XL) 50 mg 24 hr tablet Take 25 mg by mouth daily      Multiple Vitamin (MULTIVITAMIN) tablet Take 1 tablet by mouth daily      tamsulosin (FLOMAX) 0 4 mg Take 0 4 mg by mouth daily with dinner       No current facility-administered medications for this visit  Allergies   Allergen Reactions    Morphine GI Intolerance    Tetanus Toxoid Swelling    Tetanus-Diphth-Acell Pertussis [Tetanus-Diphth-Acell Pertussis] GI Intolerance       Review of Systems    Video Exam    There were no vitals filed for this visit  Physical Exam     I spent 4 hours of group + case management minutes with patient today in which greater than 50% of the time was spent in counseling/coordination of care regarding see notes  VIRTUAL VISIT DISCLAIMER    Dedra Carrera acknowledges that he has consented to an online visit or consultation   He understands that the online visit is based solely on information provided by him, and that, in the absence of a face-to-face physical evaluation by the physician, the diagnosis he receives is both limited and provisional in terms of accuracy and completeness  This is not intended to replace a full medical face-to-face evaluation by the physician  Jada Putnam understands and accepts these terms

## 2021-02-03 NOTE — PSYCH
This note was not shared with the patient due to this is a psychotherapy note   Subjective:     Patient ID: Tri Goyal is a 54 y o  male  [facility] Clinical Progress Notes      Specialized Services Documentation  Therapist must complete separate progress note for each specific clinical activity in which the individual participated during the day  Allied Therapy   This group was facilitated virtually in a private office using [employer] and Approved [employer] Teams  Tri Goyal consented to the use of tele-video modality of treatment  [id number] Tri Goyal  actively shared in [facility] group focused DBT Module Interpersonal Effectiveness and [facility] skill  Engaged in tasks exploring what makes it difficult to share and strategies to improve with supports  Emerita Amaya participated in discussion related to communication roadblocks  He was an active participant as group worked together to practice writing out a meaningful interaction using [facility]  Some progress toward goal noted  Continue AT to encourage sharing, personal advocacy and practice of wellness tools  Tx Plan Objective: 1 5, Therapist:  Kemar JERNIGAN    Education Therapy   [id number] Tri Goyal actively shared in morning assessment and goal review  Presented as Receptive related to readiness to learn  Tri Goyal did complete goal from last treatment day identifying gaining responsibility  did not present with any barriers to learning  Teresa engaged throughout the treatment day  Was engaged in learning related to Illness, Aftercare and Wellness Tools  Staff utilized Verbal, A/V and Demonstration teaching methods    Tri Goyal shared area of learning and set a goal for outside of program to complete to do list       Tx Plan Objective: 1 5, Therapist:  Kemar JERNIGAN      Case Management Note  This case management session was facilitated virtually in a private office using OTOY and Approved [employer] Teams  Donnell Vieyra consented to the use of tele-video modality of treatment  Josh Mcmahon [facility]    Current suicide risk : Low     INDIVIDUAL PSYCHOTHERAPY  [id number] Met with Donnell Vieyra  Reported that he was "busy" today and overall focused on getting tasks accomplished  Progress noted in that he is feeling  Better leaving the home  Reviewed anticipated discharge next week  Additional FMLA paperwork received and discussed  Medications changes/added/denied? No    Treatment session number: 15 IOP 8    Individual Case Management Visit provided today?  Yes     [facility] follow up physician's orders: na

## 2021-02-04 ENCOUNTER — DOCUMENTATION (OUTPATIENT)
Dept: PSYCHOLOGY | Facility: CLINIC | Age: 56
End: 2021-02-04

## 2021-02-04 ENCOUNTER — APPOINTMENT (OUTPATIENT)
Dept: PSYCHOLOGY | Facility: CLINIC | Age: 56
End: 2021-02-04
Payer: COMMERCIAL

## 2021-02-04 NOTE — PROGRESS NOTES
This note was not shared with the patient due to this is a psychotherapy note   Subjective:     Patient ID: Wenceslao Vivar is a [address] [place] y o  male  [facility] Clinical Progress Notes      Specialized Services Documentation  Therapist must complete separate progress note for each specific clinical activity in which the individual participated during the day  Case Management Note    [facility]    Current suicide risk : Low     2582 Excused due to IOP status - additional LA paperwork completed and sent to [facility] for his section to be completed  Medications changes/added/denied? No    Treatment session number: na    Individual Case Management Visit provided today?  No    [facility] follow up physician's orders: na

## 2021-02-05 ENCOUNTER — OFFICE VISIT (OUTPATIENT)
Dept: PSYCHOLOGY | Facility: CLINIC | Age: 56
End: 2021-02-05
Payer: COMMERCIAL

## 2021-02-05 DIAGNOSIS — F33.2 MAJOR DEPRESSIVE DISORDER, RECURRENT SEVERE WITHOUT PSYCHOTIC FEATURES (HCC): Primary | ICD-10-CM

## 2021-02-05 PROCEDURE — S9480 INTENSIVE OUTPATIENT PSYCHIA: HCPCS

## 2021-02-05 NOTE — PSYCH
This note was not shared with the patient due to this is a psychotherapy note  Subjective:     Patient ID: Cande Mcginnis is a 54 y o  male  [facility] Clinical Progress Notes      Specialized Services Documentation  Therapist must complete separate progress note for each specific clinical activity in which the individual participated during the day  Group Psychotherapy ([id number])   This group was facilitated virtually in a private office using [employer] and Approved [employer] Teams  Cande Mcginnis consented to the use of tele-video modality of treatment  Cande Mcginnis actively shared in psychotherapy group this morning which focused on the Weekly Wellness Assessment  He engaged in self-rate and discussion  Group members individually went through the assessment and rated themselves based on the past week  Group members shared assessment results  Group discussed the six domains of wellness; physical, emotional, cognitive, vocational, social, and spiritual  Group discussed strengths, challenges, barriers, and ways to increase each domain  Charles Goss chose the social domain and identified the need to get out of his house more regularly  He plans to challenge himself by limiting the amount of time he watches the news and avoid overthinking  Good progress toward goal noted  Continue psychotherapy to further encourage self-reflection on strengths and challenges with personal wellness      Tx Plan Objective: 1 4 Therapist:  Mayte Frye RN

## 2021-02-05 NOTE — PSYCH
Virtual Regular Visit      Assessment/Plan:    Problem List Items Addressed This Visit        Other    Major depressive disorder, recurrent severe without psychotic features ([facility] [address] ) - Primary               Reason for visit is VIRTUAL PHP DUE TO COVID-19       Encounter provider BE [address]    Provider located at [address] [id number]      Recent Visits  Date Type Provider Dept   02/03/21 Office Visit [facility]   02/01/21 Office Visit [facility]   01/29/21 Office Visit [facility]   Showing recent visits within past 7 days and meeting all other requirements     Today's Visits  Date Type Provider Dept   02/05/21 Office Visit [facility]   Showing today's visits and meeting all other requirements     Future Appointments  No visits were found meeting these conditions  Showing future appointments within next 150 days and meeting all other requirements        The patient was identified by name and date of birth  Tri Goyal was informed that this is a telemedicine visit and that the visit is being conducted through [employer] and patient was informed that this is a secure, HIPAA-compliant platform  He agrees to proceed     My office door was closed  No one else was in the room  He acknowledged consent and understanding of privacy and security of the video platform  The patient has agreed to participate and understands they can discontinue the visit at any time  Patient is aware this is a billable service  Subjective  Tri Goyal is a 54 y o  male          HPI     Past Medical History:   Diagnosis Date    Anxiety     Depression     Diabetes mellitus ([facility] [address] )     Hypertension        Past Surgical History:   Procedure Laterality Date    BACK SURGERY         Current Outpatient Medications   Medication Sig Dispense Refill    amLODIPine (NORVASC) 5 mg tablet Take 5 mg by mouth daily      atorvastatin (LIPITOR) 40 mg tablet Take 40 mg by mouth daily      buPROPion (WELLBUTRIN XL) 300 mg 24 hr tablet Take 1 tablet (300 mg total) by mouth daily 30 tablet 1    DULoxetine (CYMBALTA) 60 mg delayed release capsule Take 60 mg by mouth daily      gabapentin (NEURONTIN) 600 MG tablet Take 600 mg by mouth 3 (three) times a day      hydrochlorothiazide (HYDRODIURIL) 25 mg tablet Take 25 mg by mouth daily      hydrOXYzine HCL (ATARAX) 50 mg tablet Take 50 mg by mouth daily as needed for anxiety Half to 1 tablet daily p r n  For severe anxiety      insulin lispro (HumaLOG) 100 units/mL by Subcutaneous Insulin Pump route as needed      lisinopril (ZESTRIL) 40 mg tablet Take 40 mg by mouth daily      metFORMIN (GLUCOPHAGE) 1000 MG tablet Take 1,000 mg by mouth 2 (two) times a day with meals      metoprolol succinate (TOPROL-XL) 50 mg 24 hr tablet Take 25 mg by mouth daily      Multiple Vitamin (MULTIVITAMIN) tablet Take 1 tablet by mouth daily      tamsulosin (FLOMAX) 0 4 mg Take 0 4 mg by mouth daily with dinner       No current facility-administered medications for this visit  Allergies   Allergen Reactions    Morphine GI Intolerance    Tetanus Toxoid Swelling    Tetanus-Diphth-Acell Pertussis [Tetanus-Diphth-Acell Pertussis] GI Intolerance       Review of Systems    Video Exam    There were no vitals filed for this visit  Physical Exam     I spent 4 hours of group + case management minutes with patient today in which greater than 50% of the time was spent in counseling/coordination of care regarding see notes  VIRTUAL VISIT DISCLAIMER    Sancho Bazan acknowledges that he has consented to an online visit or consultation   He understands that the online visit is based solely on information provided by him, and that, in the absence of a face-to-face physical evaluation by the physician, the diagnosis he receives is both limited and provisional in terms of accuracy and completeness  This is not intended to replace a full medical face-to-face evaluation by the physician  Coby Cooper understands and accepts these terms

## 2021-02-05 NOTE — PSYCH
This note was not shared with the patient due to this is a psychotherapy note   Subjective:     Patient ID: Karley Colindres is a 54 y o  male  [facility] Clinical Progress Notes      Specialized Services Documentation  Therapist must complete separate progress note for each specific clinical activity in which the individual participated during the day  GROUP PSYCHOTHERAPY ([id number]) Group was facilitated virtually in a private office using HIPAA Compliant and Approved [employer] Teams  Karley Colindres consented to the use of tele-video modality of treatment and was virtually present for group psychotherapy today  The group was educated the physiology of anxiety and the anxiety cycle  Members engaged in open discussion about challenges when coping with anxiety and understanding the brains role  Obey Aiken shared his experiences with breaking the anxiety cycle around going to stores during the pandemic  Obey Homeroramakrishna continues to make progress towards goals through verbal participation in group  Continue with psychotherapy     TX Plan Objectives: 1 1, 1 2, 1 4   Therapist: Kayleigh Mcrae MA

## 2021-02-05 NOTE — PSYCH
This note was not shared with the patient due to this is a psychotherapy note       Subjective:     Patient ID: Angus Ribeiro is a 54 y o  male  [facility] Clinical Progress Notes      Specialized Services Documentation  Therapist must complete separate progress note for each specific clinical activity in which the individual participated during the day  Allied Therapy   This group was facilitated virtually in a private office using [employer] and Approved [employer] Teams  Angus Ribeiro consented to the use of tele-video modality of treatment  [id number] Angus Ribeiro  actively shared in [facility] group focused on self awareness  Ryley Giles engaged in morenita discussion exploring core aspects of self  Group discussed building strengths and strategies to take positives and use them to challenging negative thoughts  DBT biosocial therapy reviewed along with engaging in a loving kindness meditation  He identified compassion as a strength and confidence as something he would like to work on  Some progress noted toward  Continue AT to increase self awareness and skills that promote wellness  Tx Plan Objective: 1 5, Therapist:  Eileen JERNIGAN    Case Management Note  This case management session was facilitated virtually in a private office using HIPPA Compliant and Approved [employer] Teams  Angus Ribeiro consented to the use of tele-video modality of treatment  Eileen MARTINEZBC    Current suicide risk : Low   INDIVIDUAL PSYCHOTHERAPY    1250 - 1310 Met with Angus Ribeiro to discuss weekend plans and supports  Angus Ribeiro identified that he has been noticing personal improvement  Reviewed anticipated discharge for Monday  Aftercare reviewed  FMLA form was received by him yesterday  Medications changes/added/denied? No    Treatment session number: 16 IOP 9    Individual Case Management Visit provided today?  Yes     [facility] follow up physician's orders: na

## 2021-02-05 NOTE — PSYCH
[facility] Discharge Instructions:   Disposition: home  Address: [facility] [address]  [address]   Diagnosis:  Encounter Diagnosis   Name Primary?  Major depressive disorder, recurrent severe without psychotic features ([facility] [address] ) Yes      Allergies (Drug/Food): Allergies   Allergen Reactions    Morphine GI Intolerance    Tetanus Toxoid Swelling    Tetanus-Diphth-Acell Pertussis [Tetanus-Diphth-Acell Pertussis] GI Intolerance     Activity: you may not return to work until anticipated return 3/5/2021  Diet:continue Diabetic Diet  Smoking Cessation:not a smoker   Diagnostic/Laboratory Orders: none ordered  Vaccines: If you received a vaccine, please notify your family physician on your next visit  For more information, please call [phone]  Follow-up appointments/Referrals:     Medication Management:  Isael Jackman NP   [address] [phone]  March 2, 2021 9:30 am    Outpatient Therapy:  Dluce Reyes  [phone]  February 17, 2021 2pm    Keep Follow Up Appointments, Take Medication As Prescribed, Utilize WRAP, Consider Support Groups and/or [employer] [phone] (as of 4/2021 - [phone])    Crisis Intervention (Emergency) [facility] Service:  [place]: 4-755.495.1570  [place] Crisis Intervention Hotline: 0-560.230.5499  National Suicide Crisis Hotline: 8-562.920.2232  I, the undersigned, have received and understand the above instructions        Patient/Rep Signature: __________________________________     Date/Time: ______________     Physician Signature: ____________________________________    Date/Time: ______________      Signature: ________________________________     Date/Time: ______________

## 2021-02-05 NOTE — PSYCH
This note was not shared with the patient due to this is a psychotherapy note     Subjective:     Patient ID: Elnora Lundborg is a 54 y o  male  [facility] Clinical Progress Notes      Specialized Services Documentation  Therapist must complete separate progress note for each specific clinical activity in which the individual participated during the day  Education Therapy   [id number] Elnora Lundborg actively shared in morning assessment and goal review  Presented as Receptive related to readiness to learn  Elnora Lundborg did complete goal from last treatment day identifying gaining relief as he was able to push past his anxiety and get out of the house  did not present with any barriers to learning  Teresa engaged throughout the treatment day  Was engaged in learning related to [employer]  Staff utilized Verbal and A/V teaching methods  Elnora Lundborg shared area of learning and set a goal for outside of program to practice skills learned in program throughout the weekend        Tx Plan Objective: 1 1,1 2,1 5, Therapist:  TIM New

## 2021-02-08 ENCOUNTER — OFFICE VISIT (OUTPATIENT)
Dept: PSYCHOLOGY | Facility: CLINIC | Age: 56
End: 2021-02-08
Payer: COMMERCIAL

## 2021-02-08 DIAGNOSIS — F33.2 MAJOR DEPRESSIVE DISORDER, RECURRENT SEVERE WITHOUT PSYCHOTIC FEATURES (HCC): Primary | ICD-10-CM

## 2021-02-08 PROCEDURE — S9480 INTENSIVE OUTPATIENT PSYCHIA: HCPCS

## 2021-02-08 NOTE — PSYCH
Subjective:     Patient ID: Angus Ribeiro is a 54 y o  male  [facility] Clinical Progress Notes      Specialized Services Documentation  Therapist must complete separate progress note for each specific clinical activity in which the individual participated during the day  This was not shared due to this is a psychotherapy note  GROUP PSYCHOTHERAPY ([id number]) Group was facilitated virtually in a private office using HIPAA Compliant and Approved [employer] Teams  Ryley Giles consented to the use of tele-video modality of treatment and was virtually present for group psychotherapy today  The group engaged in a discussion and activity regarding the relationship they currently have with themselves  Each member was asked to explore who they are through a self-exploration exercise  The self-exploration sentence completion exercise focuses on personal values, desires, strengths, and weaknesses  Members were asked to respond to the following:   - My biggest strength is:  - My biggest weakness is:   - I am afraid that:    Ryley Giles seemed very engaged throughout the group session and was willing to participate without prompting  Ryley Giles stated that their biggest weakness is ruminating over the "What ifs" of life  Ryley Tisha will be discharged at the end of treatment day today      Tx plan objective: 1 1, 1 2   Therapist: Faith Oh MA

## 2021-02-08 NOTE — PSYCH
Assessment/Plan:      Diagnoses and all orders for this visit:    Major depressive disorder, recurrent severe without psychotic features ([facility] [address] )          Subjective:     Patient ID: Tri Goyal is a 54 y o  male  [facility] Treatment Plan   AREAS OF NEED: Depression as evidenced by anhedonia, lack of interest, negative thoughts with hopelessness, anxiety, isolation, sleep disturbance related to COVID-19 and work/financial stressors  Date Initiated: 1/12/2021    Strengths: family support, wants to improve     LONG TERM GOAL:   Date Initiated: 1/12/2021  1 0 I will identify 3 signs that my depression has lessened and I am more productive in my day to day life  Target Date: 2/9/2021  Completion Date: 2/8/2021      SHORT TERM OBJECTIVES:     Date Initiated: 1/12/2021  1 1 I will identify 3 mindfulness and/or distress tolerance skills I am practicing to refocus negative and/or fear thoughts  Revision Date:   Target Date: 1/22/2021  Completion Date: 1/22/2021    Date Initiated: 1/12/2021  1 2 I will identify 3 things I need to do daily to increase personal structure and 2 things I may need to avoid throughout my day  Revision Date:   Target Date: 1/22/2021  Completion Date: 1/22/2021    Date Initiated: 1/12/2021  1 3 I will take medications as prescribed and share questions and concerns if arise  Revision Date: 1/22/2021  Target Date: 1/22/2021  Completion Date:  2/8/2021    Date Initiated: 1/12/2021  1 4 I will identify 3 ways my supports can assist in my recovery and agree to staff/support contact as indicated      Revision Date: 1/22/2021  Target Date: 1/22/2021  Completion Date: 2/8/2021           7 DAY REVISION:    Date Initiated:1/22/2021  1 5 I will begin to set boundaries with myself in relation to sleep hygiene, activity level/getting things done, and my own "attitude"/ thoughts - sharing challenges and successes  Revision Date:   Target Date: 02/12/2021  Completion Date:2/8/2021      PSYCHIATRY:  Date Initiated: 1/12/2021  Medication Management and Education       Revision Date: 1/22/2021       Continue medication management  The person(s) responsible for carrying out the plan is Cj Taylor MD    NURSING:   Date Initiated: 1/12/2021  1 1,1 2,1 3,1 4 This RN will provide daily wellness group five days weekly to educate Ramila Bryson on S/S of his diagnoses and medications used in treatment  Revision Date:1/22/2021  1 1,1 2,1 3,1 4,1 5 Continue to encourage Ramila Bryson to participate in wellness groups daily to learn about symptoms, coping strategies and warning signs to promote relapse prevention  The person(s) responsible for carrying out the plan is Edouard Caceres RN    PSYCHOLOGY:   Date Initiated: 1/12/2021       1 1, 1 2, 1 4 Provide psychotherapy group 5 times per week to allow opportunity for Ramila Bryson to explore stressors and ways of coping  Revision Date: 1/22/2021  1 1,1 2,1 4,1 5 Continue to provide psychotherapy group daily to Ramila Bryson and encourage sharing of stressors, skills and positive change  The person(s) responsible for carrying out the plan is Desmond Navarrete    ALLIED THERAPY:   Date Initiated: 1/12/2021  1 1,1 2 Engage Ramila Bryson in AT group 5 times daily to encourage development and use of wellness tools to decrease symptoms and promote recovery through meaningful activity  Revision Date: 1/22/2021  1 1,1 2,1 5 Continue to engage Ramila Bryson to participate in AT group to practice wellness tools within program and transfer to home sharing successes and barriers through healthy task involvement  The person(s) responsible for carrying out the plan is DELON Pemberton     CASE MANAGEMENT:   Date Initiated: 1/12/2021      1 0 This  will meet with Ramila Bryson 3-4 times weekly to assess treatment progress, discharge planning, connection to community supports and UR as indicated    Revision Date: 1/22/2021  1 0 Continue to meet with Elnora Lundborg 3-4 times weekly to assess growth, work toward goals, continued treatment needs, dc planning and use of supports  The person(s) responsible for carrying out the plan is DELON Irby          TREATMENT REVIEW/COMMENTS:     DISCHARGE CRITERIA: Identify 3 signs of progress and complete relapse prevention plan  DISCHARGE PLAN: OP providers  Estimated Length of Stay:10 treatment days    Diagnosis and Treatment Plan explained to Bridgette Mayberry relates understanding diagnosis and is agreeable to Treatment Plan

## 2021-02-08 NOTE — PSYCH
This note was not shared with the patient due to this is a psychotherapy note   Subjective:     Patient ID: Coby Cooper is a 54 y o  male  [facility] Clinical Progress Notes      Specialized Services Documentation  Therapist must complete separate progress note for each specific clinical activity in which the individual participated during the day  GROUP PSYCHOTHERAPY ([id number]) Group was facilitated virtually in a private office using [employer]iant and Approved [employer] Teams  Coby Cooper consented to the use of tele-video modality of treatment and was virtually present for group psychotherapy today  The group members received a safe and non-judgmental space to discuss current stressors and received feedback from the group  The group discussions included implementing and understanding topics discussed in previous group, identifying and changing negative thoughts and self-compassion   Syl Keely shared about his identifying weaknesses and offered words of wisdom to other members  Sandi Albarran continues to make progress towards goals through verbal participation in group  Continue with discharge     TX Plan Objectives: 1 1, 1 2, 1 4   Therapist: Lissy Morataya MA

## 2021-02-08 NOTE — PSYCH
This note was not shared with the patient due to this is a psychotherapy note   Subjective:     Patient ID: Rey Mitchell is a 54 y o  male  [facility] Discharge Summary:   Admission Date: 1/12/2021  Patient was referred by L  [address] [facility] therapist  Discharge Date: 02/08/21   Was this a routine discharge? yes   Diagnosis: Axis I:   1  Major depressive disorder, recurrent severe without psychotic features [facility])        Treating Physician: Dr Lemuel Christiansen MD   Treatment Complications: none    Presenting Need:   Per Dr Azevedo Laughter:   Geri Saha a 54 y  o  male with depression , anxiety, type 2 diabetes mellitus, hypertension referred by Adalberto Rico , his therapist because he is feeling very depressed, anxious, suicidal ideation without plan or intent   He is very anxious secondary to the increased COVID-19 case and people done wear the PPE  Onset of symptoms was  a few months ago with gradually worsening course since that time  Psychosocial Stressors: financial, health and  COVID-19 pandemia    He states that he had been struggling since the pandemic started, he gets very upset with people that refused to wear the mask or keep the distance and he is scared that he get the disease and bring home to his wife has some medical issues   She is doing better but she is working very hard   He has not been able to work since August after he have a right foot fracture   And this is creating financial issues, he also worries about his mother who is in a nursing home  , she had dementia    He states that in December he overdosed with few medication but he did not have any major side effects other than abdominal pain, he talked to his primary doctor who connect him with a therapist   He had an obsession with the COVID-19 , he passed the whole day watching news looking for the numbers of new cases   He has difficulty enjoying things, he feels hopeless, he had a sleep difficulties, he had difficulty concentration he has on and off  suicidal ideation   TodayMichael Peraza feels very depressed and very anxious, denies any active suicidal thoughts plans or intent, denies any psychotic symptoms or manic episodes   His PHQ-9 is 17      Per this writer: Yajaira Corrales referred to [address] by OP provider due to increased depression without stabilization at lower level of care  Yanni Flores reports inability to return to work due to a foot injury in August - disability claim issues/confusion and he stopped getting paid in December - with the holidays he began to ruminate and became increasingly depressed  He has not been leaving the home and fears COVID-19  Due to his health issues and his wife's health concerns, he is immobilized by fears that he will bring it home  He watches the news daily to follow the case counts (multiple times a day)  He presented wearing 2 masks and rubber gloves  He has on/off SI, hopelessness, sleep disturbance, anhedonia, unable to see a positive future, and in December took an OD of medications      Per Yanni Flores: "I'm in a downward spiral"     Strengths: family support, wants to get better    Course of treatment includes:    group counseling, medication management, individual case management, allied therapy, psychoeducation, psychiatric evaluation and individual therapy  - family session was offered    Treatment Progress:   Yajaira Corrales attended 17 treatment days - 7 PHP and 10 IOP while in [facility]  Treatment objectives encouraged the awareness and use of mindfulness and distress tolerance to begin to challenge anxiety and fear thoughts  He was also encouraged to begin to work on strategies to increase personal structure and hold self accountable to such  Yanni Mark was an active sharer in groups as well as individual case management  He expressed emotions and explored ways to manage them in a more effective manner    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 in addition to leaving the house on a daily basis to get needs and errands met  Antionette Goldberg shared improvement through ability to go out without panicking, an improvement in his outlook on life, better coping skills to manage anxiety, and heathy use of supports  Denied SI, HI, and psychosis  Aftercare providers to receive summary  Aftercare recommendations include:    Medication Management:  Frank Monsalve NP   [address]  [facility]  [phone]  March 2, 2021 9:30 am     Outpatient Therapy:  Danita Magaña  [phone]  February 17, 2021 2pm     Keep Follow Up Appointments, Take Medication As Prescribed, Utilize WRAP, Consider Support Groups and/or Volunteer Opportunities    Discharge Medications include:  Current Outpatient Medications:     amLODIPine (NORVASC) 5 mg tablet, Take 5 mg by mouth daily, Disp: , Rfl:     atorvastatin (LIPITOR) 40 mg tablet, Take 40 mg by mouth daily, Disp: , Rfl:     buPROPion (WELLBUTRIN XL) 300 mg 24 hr tablet, Take 1 tablet (300 mg total) by mouth daily, Disp: 30 tablet, Rfl: 1    DULoxetine (CYMBALTA) 60 mg delayed release capsule, Take 60 mg by mouth daily, Disp: , Rfl:     gabapentin (NEURONTIN) 600 MG tablet, Take 600 mg by mouth 3 (three) times a day, Disp: , Rfl:     hydrochlorothiazide (HYDRODIURIL) 25 mg tablet, Take 25 mg by mouth daily, Disp: , Rfl:     hydrOXYzine HCL (ATARAX) 50 mg tablet, Take 50 mg by mouth daily as needed for anxiety Half to 1 tablet daily p r n   For severe anxiety, Disp: , Rfl:     insulin lispro (HumaLOG) 100 units/mL, by Subcutaneous Insulin Pump route as needed, Disp: , Rfl:     lisinopril (ZESTRIL) 40 mg tablet, Take 40 mg by mouth daily, Disp: , Rfl:     metFORMIN (GLUCOPHAGE) 1000 MG tablet, Take 1,000 mg by mouth 2 (two) times a day with meals, Disp: , Rfl:     metoprolol succinate (TOPROL-XL) 50 mg 24 hr tablet, Take 25 mg by mouth daily, Disp: , Rfl:     Multiple Vitamin (MULTIVITAMIN) tablet, Take 1 tablet by mouth daily, Disp: , Rfl:    tamsulosin (FLOMAX) 0 4 mg, Take 0 4 mg by mouth daily with dinner, Disp: , Rfl:

## 2021-02-08 NOTE — PSYCH
Virtual Regular Visit      Assessment/Plan:    Problem List Items Addressed This Visit        Other    Major depressive disorder, recurrent severe without psychotic features ([facility] [address] ) - Primary               Reason for visit is VIRTUAL PHP DUE TO COVID-19       [address] [address] [facility] [address]    Provider located at [address] [id number]      Recent Visits  Date Type Provider Dept   02/05/21 [facility] Visit [facility]   02/03/21 [facility] Visit [facility]   02/01/21 [facility] Visit [facility]   Showing recent visits within past 7 days and meeting all other requirements     Future Appointments  No visits were found meeting these conditions  Showing future appointments within next 150 days and meeting all other requirements        The patient was identified by name and date of birth  Todd Méndez was informed that this is a telemedicine visit and that the visit is being conducted through [employer] and patient was informed that this is a secure, HIPAA-compliant platform  He agrees to proceed     My office door was closed  No one else was in the room  He acknowledged consent and understanding of privacy and security of the video platform  The patient has agreed to participate and understands they can discontinue the visit at any time  Patient is aware this is a billable service  Subjective  Todd Méndez is a 54 y o  male          HPI     Past Medical History:   Diagnosis Date    Anxiety     Depression     Diabetes mellitus ([facility] [address] )     Hypertension        Past Surgical History:   Procedure Laterality Date    BACK SURGERY         Current Outpatient Medications   Medication Sig Dispense Refill    amLODIPine (NORVASC) 5 mg tablet Take 5 mg by mouth daily      atorvastatin (LIPITOR) 40 mg tablet Take 40 mg by mouth daily      buPROPion [facility] XL) 300 mg 24 hr tablet Take 1 tablet (300 mg total) by mouth daily 30 tablet 1    DULoxetine (CYMBALTA) 60 mg delayed release capsule Take 60 mg by mouth daily      gabapentin (NEURONTIN) 600 MG tablet Take 600 mg by mouth 3 (three) times a day      hydrochlorothiazide (HYDRODIURIL) 25 mg tablet Take 25 mg by mouth daily      hydrOXYzine HCL (ATARAX) 50 mg tablet Take 50 mg by mouth daily as needed for anxiety Half to 1 tablet daily p r n  For severe anxiety      insulin lispro (HumaLOG) 100 units/mL by Subcutaneous Insulin Pump route as needed      lisinopril (ZESTRIL) 40 mg tablet Take 40 mg by mouth daily      metFORMIN (GLUCOPHAGE) 1000 MG tablet Take 1,000 mg by mouth 2 (two) times a day with meals      metoprolol succinate (TOPROL-XL) 50 mg 24 hr tablet Take 25 mg by mouth daily      Multiple Vitamin (MULTIVITAMIN) tablet Take 1 tablet by mouth daily      tamsulosin (FLOMAX) 0 4 mg Take 0 4 mg by mouth daily with dinner       No current facility-administered medications for this visit  Allergies   Allergen Reactions    Morphine GI Intolerance    Tetanus Toxoid Swelling    Tetanus-Diphth-Acell Pertussis [Tetanus-Diphth-Acell Pertussis] GI Intolerance       Review of Systems    Video Exam    There were no vitals filed for this visit  Physical Exam     I spent 4 hours of group + case management minutes with patient today in which greater than 50% of the time was spent in counseling/coordination of care regarding see notes  VIRTUAL VISIT DISCLAIMER    Tri Goyal acknowledges that he has consented to an online visit or consultation  He understands that the online visit is based solely on information provided by him, and that, in the absence of a face-to-face physical evaluation by the physician, the diagnosis he receives is both limited and provisional in terms of accuracy and completeness  This is not intended to replace a full medical face-to-face evaluation by the physician  Dru Gunderson understands and accepts these terms

## 2021-02-08 NOTE — PSYCH
This note was not shared with the patient due to this is a psychotherapy note   Subjective:     Patient ID: Jada Putnam is a 54 y o  male  [facility] Clinical Progress Notes      Specialized Services Documentation  Therapist must complete separate progress note for each specific clinical activity in which the individual participated during the day  Allied Therapy   This group was facilitated virtually in a private office using [employer] and [employer] Teams  Jada Putnam consented to the use of tele-video modality of treatment  [id number] Jada Putnam  actively shared in [facility] group focused on DBT skill wong mind  Pradip Dunn engaged in tasks exploring differences between reasonable and emotion mind and ways to get to wise mind  Group explored the benefits of mindfulness and practiced ways to slow down to begin to develop wise mind  Group reinforced role of participating with wise mind in order to prevent getting stuck in the past or fears of the future  Some positive effort toward treatment goal noted  Discharge at the end of the treatment day  Tx Plan Objective: 1 5, Therapist:  Yonas JERNIGAN    Education Therapy   [id number] Jada Putnam actively shared in morning assessment and goal review  Presented as Receptive related to readiness to learn  Jada Jersey did complete goal from last treatment day identifying gaining support and advocacy  did not present with any barriers to learning  Teresa engaged throughout the treatment day  Was engaged in learning related to Illness, Medication, Aftercare and Wellness Tools  Staff utilized Verbal, Written and A/V teaching methods  Jada Putnam shared area of learning and set a goal for outside of program to review materials regularly  He shared areas of improvement and encouragement with peers        Tx Plan Objective: 1 0, Therapist:  Yonas JERNIGAN        Case Management Note  This case management session was facilitated virtually in a private office using [employer] and [employer] Teams  Wenceslaoramakrishna Vivar consented to the use of tele-video modality of treatment  [facility]    Current suicide risk : Low     INDIVIDUAL PSYCHOTHERAPY  [id number] Met with Wenceslao Vivar  Reviewed relapse prevention plan, aftercare plan, and medication list (copies provided)  Wenceslao Cb shared improvement through ability to go out without panicking, an improvement in his outlook on life, better coping skills to manage anxiety, and heathy use of supports  Denied SI, HI, and psychosis  Aftercare providers to receive summary  Medications changes/added/denied? No    Treatment session number: 17    Individual Case Management Visit provided today? Yes     [facility] follow up physician's orders: Discharge today to OP level of care   Tarah Alexander MD

## 2021-02-09 ENCOUNTER — APPOINTMENT (OUTPATIENT)
Dept: PSYCHOLOGY | Facility: CLINIC | Age: 56
End: 2021-02-09
Payer: COMMERCIAL

## 2021-02-10 ENCOUNTER — APPOINTMENT (OUTPATIENT)
Dept: PSYCHOLOGY | Facility: CLINIC | Age: 56
End: 2021-02-10
Payer: COMMERCIAL

## 2021-02-11 ENCOUNTER — APPOINTMENT (OUTPATIENT)
Dept: PSYCHOLOGY | Facility: CLINIC | Age: 56
End: 2021-02-11
Payer: COMMERCIAL

## 2021-02-12 ENCOUNTER — APPOINTMENT (OUTPATIENT)
Dept: PSYCHOLOGY | Facility: CLINIC | Age: 56
End: 2021-02-12
Payer: COMMERCIAL

## 2021-02-15 ENCOUNTER — IMMUNIZATIONS (OUTPATIENT)
Dept: FAMILY MEDICINE CLINIC | Facility: HOSPITAL | Age: 56
End: 2021-02-15

## 2021-02-15 DIAGNOSIS — Z23 ENCOUNTER FOR IMMUNIZATION: Primary | ICD-10-CM

## 2021-02-15 PROCEDURE — 0012A SARS-COV-2 / COVID-19 MRNA VACCINE (MODERNA) 100 MCG: CPT

## 2021-02-15 PROCEDURE — 91301 SARS-COV-2 / COVID-19 MRNA VACCINE (MODERNA) 100 MCG: CPT

## 2021-03-02 ENCOUNTER — TELEMEDICINE (OUTPATIENT)
Dept: PSYCHIATRY | Facility: CLINIC | Age: 56
End: 2021-03-02
Payer: COMMERCIAL

## 2021-03-02 VITALS — HEIGHT: 70 IN | WEIGHT: 285 LBS | BODY MASS INDEX: 40.8 KG/M2

## 2021-03-02 DIAGNOSIS — F33.2 MAJOR DEPRESSIVE DISORDER, RECURRENT SEVERE WITHOUT PSYCHOTIC FEATURES (HCC): Primary | ICD-10-CM

## 2021-03-02 DIAGNOSIS — F41.1 GENERALIZED ANXIETY DISORDER: ICD-10-CM

## 2021-03-02 DIAGNOSIS — E66.9 OBESITY, UNSPECIFIED CLASSIFICATION, UNSPECIFIED OBESITY TYPE, UNSPECIFIED WHETHER SERIOUS COMORBIDITY PRESENT: ICD-10-CM

## 2021-03-02 DIAGNOSIS — E11.49 DIABETES MELLITUS TYPE 2 WITH NEUROLOGICAL MANIFESTATIONS (HCC): ICD-10-CM

## 2021-03-02 DIAGNOSIS — E78.2 MIXED HYPERLIPIDEMIA: ICD-10-CM

## 2021-03-02 PROCEDURE — 3008F BODY MASS INDEX DOCD: CPT | Performed by: NURSE PRACTITIONER

## 2021-03-02 PROCEDURE — 90792 PSYCH DIAG EVAL W/MED SRVCS: CPT | Performed by: NURSE PRACTITIONER

## 2021-03-02 RX ORDER — LIRAGLUTIDE 6 MG/ML
INJECTION SUBCUTANEOUS
COMMUNITY
Start: 2020-12-22

## 2021-03-02 RX ORDER — BUPROPION HYDROCHLORIDE 300 MG/1
300 TABLET ORAL DAILY
Qty: 30 TABLET | Refills: 2 | Status: SHIPPED | OUTPATIENT
Start: 2021-03-02 | End: 2021-06-15 | Stop reason: SDUPTHER
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
## 2021-03-02 NOTE — BH TREATMENT PLAN
TREATMENT PLAN (Medication Management Only)        [facility]    Name and Date of Birth:  Karin Kinsey 54 y o  1965  Date of Treatment Plan: March 2, 2021  Diagnosis/Diagnoses:    1  Major depressive disorder, recurrent severe without psychotic features ([facility])    2  Generalized anxiety disorder      Strengths/Personal Resources for Self-Care: "good personality, I am out to help people"  Area/Areas of need (in own words): "decrease anxiety and my weight"  1  Long Term Goal: decrease anxiety  Target Date:6 months - 9/2/2021  Person/Persons responsible for completion of goal: Solis Sparks  2  Short Term Objective (s) - How will we reach this goal?:   A  Provider new recommended medication/dosage changes and/or continue medication(s): continue current medications as prescribed Cymbalta, Wellbutrin XL, Atarax  B  be mindful of my surroundings to help me start doing things  C  do a daily positive affirmation to decrease anxiety and depression  Target Date:6 months - 9/2/2021  Person/Persons Responsible for Completion of Goal: Solis Sparks  Progress Towards Goals: starting treatment  Treatment Modality: medication management every 6 weeks  Review due 180 days from date of this plan: 6 months - 9/2/2021  Expected length of service: ongoing treatment  My Physician/PA/NP and I have developed this plan together and I agree to work on the goals and objectives  I understand the treatment goals that were developed for my treatment    Treatment Plan done but not signed at time of office visit due to:  Plan reviewed by phone or in person  and verbal consent given due to [place] social distancing

## 2021-03-10 NOTE — PSYCH
Cardiology   Guillermo Patel DO, Carisa Bhatti MD, Naun John MD, William Riggins MD, [facility] [place]  -------------------------------------------------------------------  [facility] \[facility]\"" [facility]  [address], [address], [address] [place] [address], [address]  [phone]    Cardiology Follow Up  Lluvia Gurrola  1935  [id number]          Assessment/Plan:    1  Aortic valve disease with prior AVR  - recent echocardiogram showed normal valve function  Normal valve gradients without any regurgitation  2  Essential hypertension  - blood pressure is elevated after discontinuing amlodipine  Home blood pressure log reviewed  Blood pressure remains elevated but improved with addition of hydrochlorothiazide 25 mg daily and increase in losartan  Recent blood work showed normal renal function and electrolytes  - will increase losartan to 100 mg daily along with Toprol XL 25 mg daily     Will recheck blood work in 2 weeks  - continue home blood pressure monitoring   - Return to office in 3-4 weeks  3  Heart block, first degree  - heart rate has remained stable between 55 and 60 beats per minute  4  Sinus luis angel-tachy syndrome (HCC)  - heart rate normal today  5  Peripheral arteriosclerosis ([address] )  - continue aspirin    6  Bilateral leg edema  - resolved with discontinuation of amlodipine  7  Mixed hyperlipidemia  - last LDL cholesterol was less than 70 mg/dL  Statin was discontinued  Interval History:     Lluvia Gurrola is 80 y o  female here for followup of hypertension  She was last seen on January 6, 2021  Blood pressure was elevated and losartan was increased to 75 mg daily  Home blood pressure log was reviewed  Blood pressure is improved but still remains elevated    Average systolic blood pressure is greater than 150 mm Hg    she denies any chest pain, shortness of breath, lower extremity edema, orthopnea or paroxysmal nocturnal Virtual Regular Visit      Assessment/Plan:    Problem List Items Addressed This Visit        Other    Major depressive disorder, recurrent severe without psychotic features ([facility] [address] ) - Primary               Reason for visit is VIRTUAL PHP DUE TO COVID-19       Enc[facility] [address] [facility] [address]    Provider located at [address] [id number]      Recent Visits  Date Type Provider Dept   01/28/21 Office Visit [facility]   01/27/21 [facility] Visit [facility]   01/26/21 [facility] Visit [facility]   01/25/21 [facility] Visit [facility]   01/22/21 Office Visit [facility]   Showing recent visits within past 7 days and meeting all other requirements     Future Appointments  No visits were found meeting these conditions  Showing future appointments within next 150 days and meeting all other requirements        The patient was identified by name and date of birth  Todd Méndez was informed that this is a telemedicine visit and that the visit is being conducted through [employer] and patient was informed that this is a secure, HIPAA-compliant platform  He agrees to proceed     My office door was closed  No one else was in the room  He acknowledged consent and understanding of privacy and security of the video platform  The patient has agreed to participate and understands they can discontinue the visit at any time  Patient is aware this is a billable service  Subjective  Todd Méndez is a 54 y o  male          HPI     Past Medical History:   Diagnosis Date    Anxiety     Depression     Diabetes mellitus ([facility] [address] )     Hypertension        Past Surgical History:   Procedure Laterality Date    BACK SURGERY         Current Outpatient Medications   Medication Sig Dispense Refill    amLODIPine (NORVASC) 5 mg tablet Take 5 mg by mouth daily      atorvastatin (LIPITOR) 40 mg tablet Take 40 mg by mouth daily      buPROPion (WELLBUTRIN XL) 300 mg 24 hr tablet Take 1 tablet (300 mg total) by mouth daily 30 tablet 0    DULoxetine (CYMBALTA) 60 mg delayed release capsule Take 60 mg by mouth daily      gabapentin (NEURONTIN) 600 MG tablet Take 600 mg by mouth 3 (three) times a day      hydrochlorothiazide (HYDRODIURIL) 25 mg tablet Take 25 mg by mouth daily      hydrOXYzine HCL (ATARAX) 50 mg tablet Take 50 mg by mouth daily as needed for anxiety Half to 1 tablet daily p r n  For severe anxiety      insulin lispro (HumaLOG) 100 units/mL by Subcutaneous Insulin Pump route as needed      lisinopril (ZESTRIL) 40 mg tablet Take 40 mg by mouth daily      metFORMIN (GLUCOPHAGE) 1000 MG tablet Take 1,000 mg by mouth 2 (two) times a day with meals      metoprolol succinate (TOPROL-XL) 50 mg 24 hr tablet Take 25 mg by mouth daily      Multiple Vitamin (MULTIVITAMIN) tablet Take 1 tablet by mouth daily      tamsulosin (FLOMAX) 0 4 mg Take 0 4 mg by mouth daily with dinner       No current facility-administered medications for this visit  Allergies   Allergen Reactions    Morphine GI Intolerance    Tetanus Toxoid Swelling    Tetanus-Diphth-Acell Pertussis [Tetanus-Diphth-Acell Pertussis] GI Intolerance       Review of Systems    Video Exam    There were no vitals filed for this visit  Physical Exam     I spent 4 hours of group + case management minutes with patient today in which greater than 50% of the time was spent in counseling/coordination of care regarding see notes  VIRTUAL VISIT DISCLAIMER    Dakota Taylor acknowledges that he has consented to an online visit or consultation   He understands that the online visit is based solely on information provided by him, and that, in the absence of a face-to-face physical evaluation by the physician, the diagnosis he receives is both limited and dyspnea  Her last appointment prior to this was in November 2020, blood pressure was markedly elevated during that visit and hydrochlorothiazide 25 mg daily was added to her medication regimen  Previously, she developed lower extremity edema which improved with amlodipine discontinuation  Since then, blood pressure has been elevated  She was started on Toprol XL 25 mg daily along with losartan 50 mg daily  The patient has a history of aortic valve replacement  In June 2015, she had aortic valve replacement at [facility] for aortic valve stenosis  She has no history of coronary artery disease          Past Medical History:   Diagnosis Date    Aortic valve disease     Glaucoma     Heart block, first degree     Hyperlipidemia     Hypertension     Sinus luis angel-tachy syndrome (HCC)     SSS (sick sinus syndrome) ([facility])      Social History     Socioeconomic History    Marital status: /Civil Union     Spouse name: Not on file    Number of children: Not on file    Years of education: Not on file    Highest education level: Not on file   Occupational History    Not on file   Social Needs    Financial resource strain: Not on file    Food insecurity     Worry: Not on file     Inability: Not on file   [employer] needs     Medical: Not on file     Non-medical: Not on file   Tobacco Use    Smoking status: Never Smoker    Smokeless tobacco: Never Used   Substance and Sexual Activity    Alcohol use: Not Currently     Frequency: Never     Binge frequency: Never    Drug use: Never    Sexual activity: Not on file   Lifestyle    Physical activity     Days per week: Not on file     Minutes per session: Not on file    Stress: Not on file   Relationships    Social connections     Talks on phone: Not on file     Gets together: Not on file     Attends Sikh service: Not on file     Active member of club or organization: Not on file     Attends meetings of clubs or organizations: Not on file     Relationship status: Not on file    Intimate partner violence     Fear of current or ex partner: Not on file     Emotionally abused: Not on file     Physically abused: Not on file     Forced sexual activity: Not on file   Other Topics Concern    Not on file   Social History Narrative    Not on file      Family History   Problem Relation Age of Onset    Heart disease Mother     Hypertension Mother     Diabetes Mother     Heart disease Father     Heart attack Father          at 54    Mental illness Neg Hx      Past Surgical History:   Procedure Laterality Date    AORTIC VALVE REPLACEMENT  2015    Porcine valve       Current Outpatient Medications:     acetaminophen (TYLENOL) 325 mg tablet, Take 3 tablets (975 mg total) by mouth every 8 (eight) hours, Disp: 30 tablet, Rfl: 0    Ascorbic Acid (vitamin C) 100 MG tablet, Take 100 mg by mouth daily, Disp: , Rfl:     aspirin (ECOTRIN LOW STRENGTH) 81 mg EC tablet, Take 1 tablet (81 mg total) by mouth daily, Disp:  , Rfl: 0    famotidine (PEPCID) 20 mg tablet, Take 20 mg by mouth daily , Disp: , Rfl:     hydrochlorothiazide (HYDRODIURIL) 25 mg tablet, Take 1 tablet (25 mg total) by mouth daily, Disp: 30 tablet, Rfl: 5    losartan (COZAAR) 25 mg tablet, Take 1 tablet (25 mg total) by mouth daily, Disp: 90 tablet, Rfl: 3    losartan (COZAAR) 50 mg tablet, TAKE ONE TABLET BY MOUTH EVERY DAY, Disp: 30 tablet, Rfl: 2    LUMIGAN 0 01 % ophthalmic drops, , Disp: , Rfl:     metoprolol succinate (TOPROL-XL) 25 mg 24 hr tablet, Take 1 tablet (25 mg total) by mouth daily, Disp: 30 tablet, Rfl: 5    timolol (TIMOPTIC-XE) 0 5 % ophthalmic gel-forming, , Disp: , Rfl:         Review of Systems:  Review of Systems   Constitutional: Negative for chills and fever  HENT: Negative for ear pain and sore throat  Eyes: Negative for pain and visual disturbance  Respiratory: Negative for cough and shortness of breath  provisional in terms of accuracy and completeness  This is not intended to replace a full medical face-to-face evaluation by the physician  Shikha Robb understands and accepts these terms  Cardiovascular: Negative for chest pain and palpitations  Gastrointestinal: Negative for abdominal pain and vomiting  Genitourinary: Negative for dysuria and hematuria  Musculoskeletal: Negative for arthralgias and back pain  Skin: Negative for color change and rash  Neurological: Negative for seizures and syncope  All other systems reviewed and are negative  Physical Exam:  Vitals:  Vitals:    03/10/21 0915   BP: 158/84   BP Location: Right arm   Patient Position: Sitting   Cuff Size: Standard   Pulse: 59   Temp: 97 7 °F (36 5 °C)   TempSrc: Temporal   SpO2: 96%   Weight: 58 1 kg (128 lb)   Height: 4' 11" (1 499 m)     Physical Exam   Constitutional: She appears healthy  No distress  Eyes: Pupils are equal, round, and reactive to light  Conjunctivae are normal    Neck: Normal range of motion  Neck supple  No JVD present  Cardiovascular: Normal rate and regular rhythm  Exam reveals no gallop and no friction rub  Murmur heard  Pulmonary/Chest: Effort normal and breath sounds normal  She has no wheezes  She has no rales  Musculoskeletal:         General: No tenderness, deformity or edema  Neurological: She is alert and oriented to person, place, and time  Skin: Skin is warm and dry  Cardiographics:  Last known EF:  55-60% with normal bioprosthetic aortic valve  This note was completed in part utilizing M-[employer] Flu[employer] Direct Software  Grammatical errors, random word insertions, spelling mistakes, and incomplete sentences can be an occasional consequence of this system secondary to software limitations, ambient noise, and hardware issues  If you have any questions or concerns about the content, text, or information contained within the body of this dictation, please contact the provider for clarification

## 2021-04-06 ENCOUNTER — TELEMEDICINE (OUTPATIENT)
Dept: PSYCHIATRY | Facility: CLINIC | Age: 56
End: 2021-04-06
Payer: COMMERCIAL

## 2021-04-06 VITALS — HEIGHT: 70 IN | BODY MASS INDEX: 39.65 KG/M2 | WEIGHT: 277 LBS

## 2021-04-06 DIAGNOSIS — E78.2 MIXED HYPERLIPIDEMIA: ICD-10-CM

## 2021-04-06 DIAGNOSIS — F41.1 GENERALIZED ANXIETY DISORDER: ICD-10-CM

## 2021-04-06 DIAGNOSIS — E11.49 DIABETES MELLITUS TYPE 2 WITH NEUROLOGICAL MANIFESTATIONS (HCC): ICD-10-CM

## 2021-04-06 DIAGNOSIS — F33.2 MAJOR DEPRESSIVE DISORDER, RECURRENT SEVERE WITHOUT PSYCHOTIC FEATURES (HCC): Primary | ICD-10-CM

## 2021-04-06 PROCEDURE — 99214 OFFICE O/P EST MOD 30 MIN: CPT | Performed by: NURSE PRACTITIONER

## 2021-04-06 PROCEDURE — 90833 PSYTX W PT W E/M 30 MIN: CPT | Performed by: NURSE PRACTITIONER

## 2021-04-06 PROCEDURE — 3008F BODY MASS INDEX DOCD: CPT | Performed by: NURSE PRACTITIONER

## 2021-04-06 PROCEDURE — 1036F TOBACCO NON-USER: CPT | Performed by: NURSE PRACTITIONER
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
## 2021-06-15 ENCOUNTER — TELEMEDICINE (OUTPATIENT)
Dept: PSYCHIATRY | Facility: CLINIC | Age: 56
End: 2021-06-15
Payer: COMMERCIAL

## 2021-06-15 VITALS — HEIGHT: 70 IN | BODY MASS INDEX: 39.37 KG/M2 | WEIGHT: 275 LBS

## 2021-06-15 DIAGNOSIS — E11.49 DIABETES MELLITUS TYPE 2 WITH NEUROLOGICAL MANIFESTATIONS (HCC): ICD-10-CM

## 2021-06-15 DIAGNOSIS — E66.9 OBESITY, UNSPECIFIED CLASSIFICATION, UNSPECIFIED OBESITY TYPE, UNSPECIFIED WHETHER SERIOUS COMORBIDITY PRESENT: ICD-10-CM

## 2021-06-15 DIAGNOSIS — F33.2 MAJOR DEPRESSIVE DISORDER, RECURRENT SEVERE WITHOUT PSYCHOTIC FEATURES (HCC): Primary | ICD-10-CM

## 2021-06-15 DIAGNOSIS — E78.2 MIXED HYPERLIPIDEMIA: ICD-10-CM

## 2021-06-15 DIAGNOSIS — F41.1 GENERALIZED ANXIETY DISORDER: ICD-10-CM

## 2021-06-15 PROCEDURE — 99213 OFFICE O/P EST LOW 20 MIN: CPT | Performed by: NURSE PRACTITIONER

## 2021-06-15 PROCEDURE — 3008F BODY MASS INDEX DOCD: CPT | Performed by: NURSE PRACTITIONER

## 2021-06-15 PROCEDURE — 1036F TOBACCO NON-USER: CPT | Performed by: NURSE PRACTITIONER

## 2021-06-15 RX ORDER — BUPROPION HYDROCHLORIDE 300 MG/1
300 TABLET ORAL DAILY
Qty: 30 TABLET | Refills: 2 | Status: SHIPPED | OUTPATIENT
Start: 2021-06-15 | End: 2021-09-09 | Stop reason: SDUPTHER
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
## 2021-09-08 NOTE — PSYCH
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 to Denny Navarrete  Recent stressor including COVID-19 issues, job stress and occasional anxiety discussed with Denny Navarrete  Coping strategies reviewed with Denny Navarrete  Importance of follow up with family physician for medical issues reviewed with Denny Navarrete  Supportive therapy provided  This note was shared with patient  NHAN Clancy 09/09/21      Portions of the record may have been created with voice recognition software  Occasional wrong word or "sound a like" substitutions may have occurred due to the inherent limitations of voice recognition software  Read the chart carefully and recognize, using context, where substitutions have occurred

## 2021-09-09 ENCOUNTER — TELEMEDICINE (OUTPATIENT)
Dept: PSYCHIATRY | Facility: CLINIC | Age: 56
End: 2021-09-09
Payer: COMMERCIAL

## 2021-09-09 VITALS — BODY MASS INDEX: 42.23 KG/M2 | WEIGHT: 295 LBS | HEIGHT: 70 IN

## 2021-09-09 DIAGNOSIS — E78.2 MIXED HYPERLIPIDEMIA: ICD-10-CM

## 2021-09-09 DIAGNOSIS — E11.49 DIABETES MELLITUS TYPE 2 WITH NEUROLOGICAL MANIFESTATIONS (HCC): ICD-10-CM

## 2021-09-09 DIAGNOSIS — E66.9 OBESITY, UNSPECIFIED CLASSIFICATION, UNSPECIFIED OBESITY TYPE, UNSPECIFIED WHETHER SERIOUS COMORBIDITY PRESENT: ICD-10-CM

## 2021-09-09 DIAGNOSIS — F33.2 MAJOR DEPRESSIVE DISORDER, RECURRENT SEVERE WITHOUT PSYCHOTIC FEATURES (HCC): Primary | ICD-10-CM

## 2021-09-09 DIAGNOSIS — F41.1 GENERALIZED ANXIETY DISORDER: ICD-10-CM

## 2021-09-09 PROCEDURE — 90833 PSYTX W PT W E/M 30 MIN: CPT | Performed by: NURSE PRACTITIONER

## 2021-09-09 PROCEDURE — 99214 OFFICE O/P EST MOD 30 MIN: CPT | Performed by: NURSE PRACTITIONER

## 2021-09-09 RX ORDER — BUPROPION HYDROCHLORIDE 300 MG/1
300 TABLET ORAL DAILY
Qty: 90 TABLET | Refills: 0 | Status: SHIPPED | OUTPATIENT
Start: 2021-09-09 | End: 2021-10-14 | Stop reason: SDUPTHER

## 2021-09-09 RX ORDER — DULOXETIN HYDROCHLORIDE 20 MG/1
20 CAPSULE, DELAYED RELEASE ORAL DAILY
Qty: 90 CAPSULE | Refills: 0 | Status: SHIPPED | OUTPATIENT
Start: 2021-09-09 | End: 2021-10-14 | Stop reason: SDUPTHER

## 2021-09-09 NOTE — BH TREATMENT PLAN
TREATMENT PLAN (Medication Management Only)        [facility]    Name and Date of Birth:  Lázaro Roche 54 y o  1965  Date of Treatment Plan: September 9, 2021  Diagnosis/Diagnoses:    1  Major depressive disorder, recurrent severe without psychotic features ([facility] )    2  Generalized anxiety disorder    3  Obesity, unspecified classification, unspecified obesity type, unspecified whether serious comorbidity present    4  Diabetes mellitus type 2 with neurological manifestations ([facility] )    5  Mixed hyperlipidemia      Strengths/Personal Resources for Self-Care: "I am motivated to go to work every day, I take care of my wife, 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"  Area/Areas of need (in own words): "decrease anxiety and lose weight and completing tasks"  1  Long Term Goal: improve motivation to complete tasks around house  Target Date:6 months - 3/9/2022  Person/Persons responsible for completion of goal: Sebastian Verduzco  2  Short Term Objective (s) - How will we reach this goal?:   A  Provider new recommended medication/dosage changes and/or continue medication(s): Medication changes: I am having Sadia Peraza start on DULoxetine  I am also having him maintain his atorvastatin, metoprolol succinate, tamsulosin, lisinopril, insulin lispro, gabapentin, multivitamin, hydrochlorothiazide, amLODIPine, metFORMIN, DULoxetine, hydrOXYzine HCL, Victoza, calcium-vitamin D, and buPROPion   B  do yard work 15 minutes daily  C  complete task at my house first before helping others    Target Date:6 months - 3/9/2022  Person/Persons Responsible for Completion of Goal: Sebastian Verduzco  Progress Towards Goals: continuing treatment  Treatment Modality: medication management every 10 weeks  Review due 180 days from date of this plan: 6 months - 3/9/2022  Expected length of service: ongoing treatment  My Physician/PA/NP and I have developed this plan together and I agree to work on the goals and objectives  I understand the treatment goals that were developed for my treatment

## 2021-09-21 ENCOUNTER — TELEPHONE (OUTPATIENT)
Dept: GASTROENTEROLOGY | Facility: CLINIC | Age: 56
End: 2021-09-21

## 2021-09-21 NOTE — TELEPHONE ENCOUNTER
09/21/21  Screened by: Arthur Randall    Referring Provider     Pre- Screening: There is no height or weight on file to calculate BMI  Has patient been referred for a routine screening Colonoscopy? no  Is the patient between 39-70 years old? yes      Previous Colonoscopy no   If yes:    Date:     Facility:     Reason:     SCHEDULING STAFF: If the patient is between 45yrs-49yrs, please advise patient to confirm benefits/coverage with their insurance company for a routine screening colonoscopy, some insurance carriers will only cover at [address] or older  If the patient is over 66years old, please schedule an office visit  Does the patient want to see a Gastroenterologist prior to their procedure OR are they having any GI symptoms? no    Has the patient been hospitalized or had abdominal surgery in the past 6 months? no    Does the patient use supplemental oxygen? no    Does the patient take Coumadin, Lovenox, Plavix, Elliquis, Xarelto, or other blood thinning medication? no    Has the patient had a stroke, cardiac event, or stent placed in the past year? no    SCHEDULING STAFF: If patient answers NO to above questions, then schedule procedure  If patient answers YES to above questions, then schedule office appointment  If patient is between 45yrs - 49yrs, please advise patient that we will have to confirm benefits & coverage with their insurance company for a routine screening colonoscopy

## 2021-10-11 ENCOUNTER — TELEPHONE (OUTPATIENT)
Dept: GASTROENTEROLOGY | Facility: CLINIC | Age: 56
End: 2021-10-11

## 2021-10-14 ENCOUNTER — TELEPHONE (OUTPATIENT)
Dept: PSYCHIATRY | Facility: CLINIC | Age: 56
End: 2021-10-14

## 2021-10-14 ENCOUNTER — TELEMEDICINE (OUTPATIENT)
Dept: PSYCHIATRY | Facility: CLINIC | Age: 56
End: 2021-10-14

## 2021-10-14 DIAGNOSIS — E78.2 MIXED HYPERLIPIDEMIA: ICD-10-CM

## 2021-10-14 DIAGNOSIS — E11.49 DIABETES MELLITUS TYPE 2 WITH NEUROLOGICAL MANIFESTATIONS (HCC): ICD-10-CM

## 2021-10-14 DIAGNOSIS — F33.2 MAJOR DEPRESSIVE DISORDER, RECURRENT SEVERE WITHOUT PSYCHOTIC FEATURES (HCC): Primary | ICD-10-CM

## 2021-10-14 DIAGNOSIS — E66.9 OBESITY, UNSPECIFIED CLASSIFICATION, UNSPECIFIED OBESITY TYPE, UNSPECIFIED WHETHER SERIOUS COMORBIDITY PRESENT: ICD-10-CM

## 2021-10-14 DIAGNOSIS — F41.1 GENERALIZED ANXIETY DISORDER: ICD-10-CM

## 2021-10-14 RX ORDER — DULOXETIN HYDROCHLORIDE 60 MG/1
60 CAPSULE, DELAYED RELEASE ORAL DAILY
Qty: 90 CAPSULE | Refills: 0 | Status: SHIPPED | OUTPATIENT
Start: 2021-10-14 | End: 2022-03-31 | Stop reason: SDUPTHER

## 2021-10-14 RX ORDER — BUPROPION HYDROCHLORIDE 300 MG/1
300 TABLET ORAL DAILY
Qty: 90 TABLET | Refills: 0 | Status: SHIPPED | OUTPATIENT
Start: 2021-10-14 | End: 2022-03-31 | Stop reason: SDUPTHER

## 2021-10-14 RX ORDER — DULOXETIN HYDROCHLORIDE 20 MG/1
20 CAPSULE, DELAYED RELEASE ORAL DAILY
Qty: 90 CAPSULE | Refills: 0 | Status: SHIPPED | OUTPATIENT
Start: 2021-10-14 | End: 2022-03-31 | Stop reason: SDUPTHER

## 2021-10-14 NOTE — TELEPHONE ENCOUNTER
----- Message from NHAN Pittman sent at 10/14/2021  9:58 AM EDT -----  Regarding: Transition of care   Please call patient to schedule with next available provider for 60 minute transition of care appointment.  This provider did send 90 days of refills to pharmacy today.  Unfortunately I was unable to do a  visit today as the patient was in [place].  I did however write a quick note and refill medications.  Patient does understand that I am leaving the practice and he is expecting a call from the intake department.      There are no charges for today's visit

## 2022-03-31 ENCOUNTER — TELEPHONE (OUTPATIENT)
Dept: PSYCHIATRY | Facility: CLINIC | Age: 57
End: 2022-03-31

## 2022-03-31 DIAGNOSIS — F41.1 GENERALIZED ANXIETY DISORDER: ICD-10-CM

## 2022-03-31 DIAGNOSIS — F33.2 MAJOR DEPRESSIVE DISORDER, RECURRENT SEVERE WITHOUT PSYCHOTIC FEATURES (HCC): ICD-10-CM

## 2022-03-31 DIAGNOSIS — E11.49 DIABETES MELLITUS TYPE 2 WITH NEUROLOGICAL MANIFESTATIONS (HCC): ICD-10-CM

## 2022-03-31 RX ORDER — DULOXETIN HYDROCHLORIDE 60 MG/1
60 CAPSULE, DELAYED RELEASE ORAL DAILY
Qty: 90 CAPSULE | Refills: 0 | Status: SHIPPED | OUTPATIENT
Start: 2022-03-31 | End: 2022-07-28 | Stop reason: SDUPTHER

## 2022-03-31 RX ORDER — DULOXETIN HYDROCHLORIDE 20 MG/1
20 CAPSULE, DELAYED RELEASE ORAL DAILY
Qty: 90 CAPSULE | Refills: 0 | Status: SHIPPED | OUTPATIENT
Start: 2022-03-31 | End: 2022-06-23

## 2022-03-31 RX ORDER — BUPROPION HYDROCHLORIDE 300 MG/1
300 TABLET ORAL DAILY
Qty: 90 TABLET | Refills: 0 | Status: SHIPPED | OUTPATIENT
Start: 2022-03-31 | End: 2022-06-23

## 2022-03-31 NOTE — TELEPHONE ENCOUNTER
Chart reviewed  Refill was sent to the patient's preferred pharmacy, covering patient's primary psychiatric provider, Anabela Lebron, [address] [place] [address]

## 2022-03-31 NOTE — TELEPHONE ENCOUNTER
Yulissa Avila called for refills of Duloxetine 60 mg and 20 mg  He also needs refills of the Bupropion 300 mg  Intake- can someone please reach out to Yulissa Avila to set up a new provider appointment since Harriet Amor is no longer here  Thanks! Will ask covering provider to review in Higinio Cervantes's absence   Next appointment to be set up

## 2022-06-23 DIAGNOSIS — E11.49 DIABETES MELLITUS TYPE 2 WITH NEUROLOGICAL MANIFESTATIONS (HCC): ICD-10-CM

## 2022-06-23 DIAGNOSIS — F41.1 GENERALIZED ANXIETY DISORDER: ICD-10-CM

## 2022-06-23 DIAGNOSIS — F33.2 MAJOR DEPRESSIVE DISORDER, RECURRENT SEVERE WITHOUT PSYCHOTIC FEATURES (HCC): ICD-10-CM

## 2022-06-23 RX ORDER — DULOXETIN HYDROCHLORIDE 20 MG/1
20 CAPSULE, DELAYED RELEASE ORAL DAILY
Qty: 90 CAPSULE | Refills: 0 | Status: SHIPPED | OUTPATIENT
Start: 2022-06-23 | End: 2022-07-28 | Stop reason: SDUPTHER

## 2022-07-27 PROBLEM — I10 ESSENTIAL HYPERTENSION: Status: ACTIVE | Noted: 2022-07-27

## 2022-07-27 RX ORDER — INSULIN LISPRO 100 [IU]/ML
INJECTION, SOLUTION INTRAVENOUS; SUBCUTANEOUS
COMMUNITY
Start: 2022-06-20
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
## 2022-07-28 ENCOUNTER — OFFICE VISIT (OUTPATIENT)
Dept: PSYCHIATRY | Facility: CLINIC | Age: 57
End: 2022-07-28
Payer: COMMERCIAL

## 2022-07-28 VITALS — BODY MASS INDEX: 37.08 KG/M2 | HEIGHT: 70 IN | WEIGHT: 259 LBS

## 2022-07-28 DIAGNOSIS — F33.41 MDD (MAJOR DEPRESSIVE DISORDER), RECURRENT, IN PARTIAL REMISSION (HCC): ICD-10-CM

## 2022-07-28 DIAGNOSIS — F41.1 GENERALIZED ANXIETY DISORDER: ICD-10-CM

## 2022-07-28 PROCEDURE — 90792 PSYCH DIAG EVAL W/MED SRVCS: CPT | Performed by: STUDENT IN AN ORGANIZED HEALTH CARE EDUCATION/TRAINING PROGRAM

## 2022-07-28 RX ORDER — DULOXETIN HYDROCHLORIDE 60 MG/1
60 CAPSULE, DELAYED RELEASE ORAL DAILY
Qty: 90 CAPSULE | Refills: 0 | Status: SHIPPED | OUTPATIENT
Start: 2022-07-28 | End: 2022-10-26

## 2022-07-28 RX ORDER — BUPROPION HYDROCHLORIDE 300 MG/1
300 TABLET ORAL DAILY
Qty: 90 TABLET | Refills: 0 | Status: SHIPPED | OUTPATIENT
Start: 2022-07-28 | End: 2022-10-26

## 2022-07-28 RX ORDER — DULOXETIN HYDROCHLORIDE 20 MG/1
20 CAPSULE, DELAYED RELEASE ORAL DAILY
Qty: 90 CAPSULE | Refills: 0 | Status: SHIPPED | OUTPATIENT
Start: 2022-07-28 | End: 2022-10-26

## 2022-07-28 NOTE — BH TREATMENT PLAN
TREATMENT PLAN (Medication Management Only)        [facility]    Name and Date of Birth:  Marika Mckinney 64 y o  1965  Date of Treatment Plan: July 28, 2022  Diagnosis/Diagnoses:    1  Major depressive disorder, recurrent severe without psychotic features ([facility] [address] )    2  Generalized anxiety disorder    3  Diabetes mellitus type 2 with neurological manifestations [facility])      Strengths/Personal Resources for Self-Care: "supportive family, his dog"  Area/Areas of need (in own words): anxiety, depression  1  Long Term Goal: maintain control of anxiety and depressive sxs  Target Date:6 months - 1/28/2023  Person/Persons responsible for completion of goal: Gia Luna  2  Short Term Objective (s) - How will we reach this goal?:   A  Provider new recommended medication/dosage changes and/or continue medication(s): continue current medications as prescribed  B  N/A   C  N/A  Target Date:6 months - 1/28/2023  Person/Persons Responsible for Completion of Goal: Gia Luna  Progress Towards Goals: initiating treatment  Treatment Modality: medication management every 6 months  Review due 180 days from date of this plan: 6 months - 1/28/2023  Expected length of service: maintenance  My Physician/PA/NP and I have developed this plan together and I agree to work on the goals and objectives  I understand the treatment goals that were developed for my treatment

## 2022-10-20 ENCOUNTER — TELEPHONE (OUTPATIENT)
Dept: PSYCHIATRY | Facility: CLINIC | Age: 57
End: 2022-10-20

## 2022-10-20 NOTE — TELEPHONE ENCOUNTER
Patient is calling regarding cancelling an appointment      Date/Time: 10/20/22 at 9:00a    Reason: patient not feeling well    Patient was rescheduled: YES [x] NO []  If yes, when was Patient reschedule for: 11/10/22 at 11:00a    Patient requesting call back to reschedule: YES [] NO [x]

## 2022-11-07 DIAGNOSIS — F41.1 GENERALIZED ANXIETY DISORDER: ICD-10-CM

## 2022-11-07 DIAGNOSIS — F33.41 MDD (MAJOR DEPRESSIVE DISORDER), RECURRENT, IN PARTIAL REMISSION (HCC): ICD-10-CM

## 2022-11-08 RX ORDER — DULOXETIN HYDROCHLORIDE 20 MG/1
CAPSULE, DELAYED RELEASE ORAL
Qty: 90 CAPSULE | Refills: 0 | Status: SHIPPED | OUTPATIENT
Start: 2022-11-08

## 2022-11-08 RX ORDER — BUPROPION HYDROCHLORIDE 300 MG/1
TABLET ORAL
Qty: 90 TABLET | Refills: 0 | Status: SHIPPED | OUTPATIENT
Start: 2022-11-08

## 2022-11-09 NOTE — PSYCH
MEDICATION MANAGEMENT NOTE        [facility]      Name and Date of Birth:  Sivan Neves 62 y o  1965 MRN: [id number]    Date of Visit: November 10, 2022    Visit Time  Visit Start Time: 10:56 PM  Visit Stop Time: 11:21 PM  Total Visit Duration: 25 minutes    Reason for Visit:   Chief Complaint   Patient presents with   • Medication Management   • Depression   • Anxiety       SUBJECTIVE:  The patient arrived to his appointment for medication management and follow up visit for depression and anxiety after cancelled her f/u appointment on 10/20/22 and rescheduled for today  Presented calm, and cooperative  Reported feeling good  He started a new job for [employer] (3 PM to 11 PM) and likes the new job, and has good life-work balance  He noted that he has never done this work, and it is exciting and frustrating at times  He sleeps from 12:30-1 AM and gets up around 7:30-8 AM  Endorsed "ok" energy level, but at times has decreased motivation with procrastination, but does not impair his function at work or home  He is taking care of her wife who has "may physical issues with walking" when he is at home; however, she works from home despite medical conditions  Denied any changes in appetite, concentration, or daily activities  Denied feelings of anhedonia, hopelessness, helplessness, worthlessness or guilt and appeared to be future oriented  There was no thought constriction related to death  Denied SI/HI, intent or plan upon direct inquiry at this time  Denied AV/H  No anxiety sxs, specific phobia or panic attacks reported  No manic sxs, paranoid ideations or fixed delusions were elicited  Endorsed good compliance with the medications and denied any side effects  Denied smoking cigarettes, binge drinking alcohol or other illicit substance use  Given this presentation, medications are maintained at the same dosage   May transfer the psychiatric care back to his PCP given the stability of sxs and no recent changes in medication regimen  RTC in 24 weeks as requested by the patient  The patient was educated to call 911 or go to the nearest emergency room if the symptoms become overwhelming or unable to remain in control  Verbalized understanding and agreed to seek help in case of distress or concern for safety  Review Of Systems:  Pertinent items are noted in HPI; all others are negative; no recent changes in medications or health status reported  PHQ-2/9 Depression Screening    Little interest or pleasure in doing things: 0 - not at all  Feeling down, depressed, or hopeless: 0 - not at all  Trouble falling or staying asleep, or sleeping too much: 0 - not at all  Feeling tired or having little energy: 1 - several days  Poor appetite or overeatin - not at all  Feeling bad about yourself - or that you are a failure or have let yourself or your family down: 1 - several days  Trouble concentrating on things, such as reading the newspaper or watching television: 0 - not at all  Moving or speaking so slowly that other people could have noticed   Or the opposite - being so fidgety or restless that you have been moving around a lot more than usual: 0 - not at all  Thoughts that you would be better off dead, or of hurting yourself in some way: 0 - not at all  PHQ-9 Score: 2   PHQ-9 Interpretation: No or Minimal depression                Past Psychiatric History Update:   - No inpatient psychiatric admission since last encounter  - No SA or SIB since last encounter  - No incidence of violent behavior since last encounter    Past Trauma History Update:    - No new onset of abuse or traumatic events since last encounter     Past Medical History:    Past Medical History:   Diagnosis Date   • Anxiety    • Depression    • Diabetes mellitus ([facility] [address] )    • Hypertension         Past Surgical History:   Procedure Laterality Date   • BACK SURGERY     • WISDOM TOOTH EXTRACTION Allergies   Allergen Reactions   • Morphine GI Intolerance   • Tetanus Toxoid Swelling   • Tetanus-Diphth-Acell Pertussis [Tetanus-Diphth-Acell Pertussis] GI Intolerance       Substance Abuse History:    Social History     Substance and Sexual Activity   Alcohol Use Yes    Comment: occasional     Social History     Substance and Sexual Activity   Drug Use No       Social History:    Social History     Socioeconomic History   • Marital status: /Civil Union     Spouse name: Not on file   • Number of children: 2   • Years of education: Not on file   • Highest education level: Associate degree: occupational, technical, or vocational program   Occupational History     Employer: [employer]   Tobacco Use   • Smoking status: Former Smoker     Quit date: 3/2/2008     Years since quittin 7   • Smokeless tobacco: Never Used   Vaping Use   • Vaping Use: Never used   Substance and Sexual Activity   • Alcohol use: Yes     Comment: occasional   • Drug use: No   • Sexual activity: Yes   Other Topics Concern   • Not on file   Social History Narrative   • Not on file     Social Determinants of Health     Financial Resource Strain: Not on file   Food Insecurity: Not on file   Transportation Needs: Not on file   Physical Activity: Not on file   Stress: Not on file   Social Connections: Not on file   Intimate Partner Violence: Not on file   Housing Stability: Not on file       Family Psychiatric History:     Family History   Problem Relation Age of Onset   • Stroke Mother         in nursing home 2019   • Dementia Mother    • Kidney failure Father    • No Known Problems Sister    • No Known Problems Brother    • Dementia Paternal Grandmother         mild    • No Known Problems Daughter    • Anxiety disorder Daughter         uses Medical Marijuana   • Psychiatric Illness Neg Hx        History Review:  The following portions of the patient's history were reviewed and updated as appropriate: allergies, current medications, past family history, past medical history, past social history, past surgical history and problem list        OBJECTIVE:     Vital signs in last 24 hours:    Vitals:    11/10/22 1156   Weight: 118 kg (260 lb)   Height: 5' 10" (1 778 m)       Mental Status Evaluation:  Appearance and attitude: appeared as stated age, cooperative and attentive, casually dressed, bearded, wearing eyeglasses, with good hygiene  Eye contact: good  Motor Function: within normal limits, intact gait, No PMA/PMR  Gait/station: normal gait/station and normal balance  Speech: normal for rate, rhythm, volume, latency, amount  Language: No overt abnormality  Mood/affect: euthymic / Affect was euthymic, reactive, in full range, normal intensity and mood congruent  Thought Processes: sequential and goal-directed  Thought content: denies suicidal ideation or homicidal ideation; no delusions or first rank symptoms  Associations: intact associations  Perceptual disturbances: denies Auditory/Visual/Tactile Hallucinations  Orientation: oriented to time, person, place and to the situational context  Cognitive Function: intact  Memory: recent and remote memory grossly intact  Intellect: average  Fund of knowledge: aware of current events, aware of past history and vocabulary average  Impulse control: good  Insight/judgment: fair/good    Pain: reported having pain in lower back  Pain scale: 8    Laboratory Results: I have personally reviewed all pertinent laboratory/tests results    Recent Labs (last 2 months):   No visits with results within 2 Month(s) from this visit     Latest known visit with results is:   Admission on 05/08/2020, Discharged on 05/08/2020   Component Date Value   • Amph/Meth UR 05/08/2020 Negative    • Barbiturate Ur 05/08/2020 Negative    • Benzodiazepine Urine 05/08/2020 Negative    • Cocaine Urine 05/08/2020 Negative    • Methadone Urine 05/08/2020 Negative    • Opiate Urine 05/08/2020 Negative    • PCP Ur 05/08/2020 Negative    • THC Urine 05/08/2020 Negative    • EXTBreath Alcohol 05/08/2020 0    • TSH 3RD GENERATON 05/08/2020 1 651    • WBC 05/08/2020 5 63    • RBC 05/08/2020 4 50    • Hemoglobin 05/08/2020 12 0    • Hematocrit 05/08/2020 37 3    • MCV 05/08/2020 83    • MCH 05/08/2020 26 7 (A)   • MCHC 05/08/2020 32 2    • RDW 05/08/2020 14 2    • MPV 05/08/2020 11 3    • Platelets 63/74/4053 322    • nRBC 05/08/2020 0    • Neutrophils Relative 05/08/2020 44    • Immat GRANS % 05/08/2020 0    • Lymphocytes Relative 05/08/2020 39    • Monocytes Relative 05/08/2020 11    • Eosinophils Relative 05/08/2020 5    • Basophils Relative 05/08/2020 1    • Neutrophils Absolute 05/08/2020 2 48    • Immature Grans Absolute 05/08/2020 0 01    • Lymphocytes Absolute 05/08/2020 2 21    • Monocytes Absolute 05/08/2020 0 60    • Eosinophils Absolute 05/08/2020 0 28    • Basophils Absolute 05/08/2020 0 05    • Sodium 05/08/2020 137    • Potassium 05/08/2020 3 5    • Chloride 05/08/2020 98 (A)   • CO2 05/08/2020 32    • ANION GAP 05/08/2020 7    • BUN 05/08/2020 12    • Creatinine 05/08/2020 0 76    • Glucose 05/08/2020 164 (A)   • Calcium 05/08/2020 9 2    • eGFR 05/08/2020 103    • SARS-CoV-2 05/08/2020 Negative    • Ventricular Rate 05/08/2020 92    • Atrial Rate 05/08/2020 92    • RI Interval 05/08/2020 146    • QRSD Interval 05/08/2020 86    • QT Interval 05/08/2020 358    • QTC Interval 05/08/2020 442    • P Axis 05/08/2020 41    • QRS [place] 05/08/2020 -14    • T Wave Axis 05/08/2020 33          Assessment/Plan:   A 64 y o    male,  (two adult daughters), domiciled w/ wife and younger daughter, employed in [employer], w/ PMH of DM, HLD, HTN, herniated disc L3-L4, chronic LBP, BPH  and PPH of MDD and JOSE, no prior psychiatric admissions, x2 in [facility] (in 2020), one prior SA (via OD), no h/o self-injurious behavior, previously being followed by NHAN Vargas (last visited in 9/9/21), on Wellbutrin  mg daily, Cymbalta 80 mg daily, Atarax 50 mg PRN who presented to the mental health clinic for the initial intake and psychiatric evaluation on 7/28/22  Presented w/ h/o increased depression and anxiety since 2020 in the context of stressors at work, wife's medical conditions and pandemics, which has been well controlled by current medication regimen  JOSE-7: 0; PHQ-7: 0  His current presentation meets criteria for MDD and JOSE by hisotry  Maintained on the same medication regimen as the patient is not interested in any changes  Diagnoses and all orders for this visit:    Screening for endocrine, nutritional, metabolic and immunity disorder  -     CBC and differential; Future  -     Comprehensive metabolic panel; Future  -     Lipid panel; Future  -     TSH, 3rd generation with Free T4 reflex; Future  -     HEMOGLOBIN A1C W/ EAG ESTIMATION; Future    MDD (major depressive disorder), recurrent, in partial remission (HCC)  -     DULoxetine (CYMBALTA) 60 mg delayed release capsule; Take 1 capsule (60 mg total) by mouth daily Along with 1 (20 mg) cap for total of 80 mg daily    Generalized anxiety disorder  -     DULoxetine (CYMBALTA) 60 mg delayed release capsule; Take 1 capsule (60 mg total) by mouth daily Along with 1 (20 mg) cap for total of 80 mg daily          Impression:  1  Screening for endocrine, nutritional, metabolic and immunity disorder  CBC and differential    Comprehensive metabolic panel    Lipid panel    TSH, 3rd generation with Free T4 reflex    HEMOGLOBIN A1C W/ EAG ESTIMATION   2  MDD (major depressive disorder), recurrent, in partial remission (HCC)  DULoxetine (CYMBALTA) 60 mg delayed release capsule   3   Generalized anxiety disorder  DULoxetine (CYMBALTA) 60 mg delayed release capsule       Treatment Recommendations/Precautions:  - f/u labs    - Continue Cymbalta 80 mg po daily for depression and anxiety  - Continue Wellbutrin  mg po daily for depression    - Medications sent to patient's pharmacy for 90 day supply    - Psychoeducation provided to the patient and benefits, potential risks and side effects discussed; importance of compliance with the psychiatric treatment reiterated, and the patient verbalized understanding of the matter     - RTC in 24 weeks as requested by the patient; may transfer the psychiatric care back to his PCP given the stability of sxs and no recent changes in medication regimen    - Educated about healthy life style, risk of falls/sedation and addiction  Patient was receptive to education   - The patient was educated about 24 hour and weekend coverage for urgent situations accessed by calling [address] [facility] [address] main practice number  - Patient was educated to call [address] [facility] ([phone]]) for behavioral crisis at anytime or 911 for any safety concerns, or go to nearest ER if his symptoms become overwhelming or unmanageable  Current Outpatient Medications   Medication Sig Dispense Refill   • DULoxetine (CYMBALTA) 60 mg delayed release capsule Take 1 capsule (60 mg total) by mouth daily Along with 1 (20 mg) cap for total of 80 mg daily 90 capsule 0   • amLODIPine (NORVASC) 5 mg tablet Take 5 mg by mouth daily     • atorvastatin (LIPITOR) 40 mg tablet Take 40 mg by mouth daily     • buPROPion (WELLBUTRIN XL) 300 mg 24 hr tablet TAKE 1 TABLET BY MOUTH EVERY DAY 90 tablet 0   • calcium-vitamin D (OSCAL) 250-125 MG-UNIT per tablet Take 1 tablet by mouth daily     • DULoxetine (CYMBALTA) 20 mg capsule TAKE 1 CAPSULE BY MOUTH DAILY ALONG WITH 60 MG CAP FOR A TOTAL OF 80 MG DAILY 90 capsule 0   • gabapentin (NEURONTIN) 600 MG tablet Take 600 mg by mouth 3 (three) times a day     • HumaLOG 100 UNIT/ML injection      • hydrochlorothiazide (HYDRODIURIL) 25 mg tablet Take 25 mg by mouth daily     • insulin lispro (HumaLOG) 100 units/mL by Subcutaneous Insulin Pump route as needed     • liraglutide (Victoza) injection INJECT 1 8 MG SQ DAILY   E11 65     • lisinopril (ZESTRIL) 40 mg tablet Take 40 mg by mouth daily     • metFORMIN (GLUCOPHAGE) 1000 MG tablet Take 1,000 mg by mouth 2 (two) times a day with meals     • metoprolol succinate (TOPROL-XL) 50 mg 24 hr tablet Take 25 mg by mouth daily     • Multiple Vitamin (MULTIVITAMIN) tablet Take 1 tablet by mouth daily     • tamsulosin (FLOMAX) 0 4 mg Take 0 4 mg by mouth daily with dinner       No current facility-administered medications for this visit  Medications Risks/Benefits      Risks, Benefits And Possible Side Effects Of Medications:    Risks, benefits, and possible side effects of medications explained to Kojo and he verbalizes understanding and agreement for treatment  Controlled Medication Discussion:     Not applicable    Psychotherapy Provided:     Individual psychotherapy provided: Yes  Counseling was provided during the session today for 16 minutes  Psychoeducation provided to the patient and was educated about the importance of compliance with the medications and psychiatric treatment  Supportive psychotherapy provided to the patient  Solution Focused Brief Therapy (SFBT) provided  Patient's emotions were validated and specific labeled praise provided  [place] suggestions were offered in a supportive non-critical way       Treatment Plan:    Completed and signed during the session: Yes - with Lucero Boss MD 11/10/22

## 2022-11-10 ENCOUNTER — OFFICE VISIT (OUTPATIENT)
Dept: PSYCHIATRY | Facility: CLINIC | Age: 57
End: 2022-11-10

## 2022-11-10 VITALS — WEIGHT: 260 LBS | BODY MASS INDEX: 37.22 KG/M2 | HEIGHT: 70 IN

## 2022-11-10 DIAGNOSIS — Z13.29 SCREENING FOR ENDOCRINE, NUTRITIONAL, METABOLIC AND IMMUNITY DISORDER: Primary | ICD-10-CM

## 2022-11-10 DIAGNOSIS — Z13.0 SCREENING FOR ENDOCRINE, NUTRITIONAL, METABOLIC AND IMMUNITY DISORDER: Primary | ICD-10-CM

## 2022-11-10 DIAGNOSIS — F41.1 GENERALIZED ANXIETY DISORDER: ICD-10-CM

## 2022-11-10 DIAGNOSIS — F33.41 MDD (MAJOR DEPRESSIVE DISORDER), RECURRENT, IN PARTIAL REMISSION (HCC): ICD-10-CM

## 2022-11-10 DIAGNOSIS — Z13.228 SCREENING FOR ENDOCRINE, NUTRITIONAL, METABOLIC AND IMMUNITY DISORDER: Primary | ICD-10-CM

## 2022-11-10 DIAGNOSIS — Z13.21 SCREENING FOR ENDOCRINE, NUTRITIONAL, METABOLIC AND IMMUNITY DISORDER: Primary | ICD-10-CM

## 2022-11-10 RX ORDER — DULOXETIN HYDROCHLORIDE 60 MG/1
60 CAPSULE, DELAYED RELEASE ORAL DAILY
Qty: 90 CAPSULE | Refills: 0 | Status: SHIPPED | OUTPATIENT
Start: 2022-11-10 | End: 2023-02-08

## 2022-11-10 NOTE — BH TREATMENT PLAN
Treatment Plan done but not signed at time of office visit due to COVID-19 precaution:  Plan reviewed with the patient during the visit and verbal consent given  TREATMENT PLAN (Medication Management Only)        Raimundo Hayes    Name and Date of Birth:  Todd Méndez 62 y o  1965  Date of Treatment Plan: November 10, 2022  Diagnosis/Diagnoses:    1  Screening for endocrine, nutritional, metabolic and immunity disorder    2  MDD (major depressive disorder), recurrent, in partial remission ([facility] [address] )    3  Generalized anxiety disorder      Strengths/Personal Resources for Self-Care: "supportive familt and his dog"  Area/Areas of need (in own words): anxiety, depression  1  Long Term Goal: maintain control of depression and anxiety sxs  Target Date:6 months - 5/10/2023  Person/Persons responsible for completion of goal: Radha Dyer  2  Short Term Objective (s) - How will we reach this goal?:   A  Provider new recommended medication/dosage changes and/or continue medication(s): continue current medications as prescribed  B  N/A   C  N/A  Target Date:6 months - 5/10/2023  Person/Persons Responsible for Completion of Goal: Radha Dyer  Progress Towards Goals: stable, continuing treatment  Treatment Modality: medication management every 6 months  Review due 180 days from date of this plan: 6 months - 5/10/2023  Expected length of service: maintenance  My Physician/PA/NP and I have developed this plan together and I agree to work on the goals and objectives  I understand the treatment goals that were developed for my treatment

## 2023-03-10 ENCOUNTER — TELEPHONE (OUTPATIENT)
Dept: PSYCHIATRY | Facility: CLINIC | Age: 58
End: 2023-03-10

## 2023-03-10 NOTE — TELEPHONE ENCOUNTER
Writer attempted to contact pt in regards to a vm we received  No details were left  Unable to establish contact  Lvm to call back

## 2023-04-03 DIAGNOSIS — F41.1 GENERALIZED ANXIETY DISORDER: ICD-10-CM

## 2023-04-03 DIAGNOSIS — F33.41 MDD (MAJOR DEPRESSIVE DISORDER), RECURRENT, IN PARTIAL REMISSION (HCC): ICD-10-CM

## 2023-04-03 RX ORDER — DULOXETIN HYDROCHLORIDE 60 MG/1
CAPSULE, DELAYED RELEASE ORAL
Qty: 90 CAPSULE | Refills: 0 | Status: SHIPPED | OUTPATIENT
Start: 2023-04-03

## 2023-04-03 RX ORDER — DULOXETIN HYDROCHLORIDE 20 MG/1
CAPSULE, DELAYED RELEASE ORAL
Qty: 30 CAPSULE | Refills: 2 | Status: SHIPPED | OUTPATIENT
Start: 2023-04-03

## 2023-08-06 DIAGNOSIS — F41.1 GENERALIZED ANXIETY DISORDER: ICD-10-CM

## 2023-08-06 DIAGNOSIS — F33.41 MDD (MAJOR DEPRESSIVE DISORDER), RECURRENT, IN PARTIAL REMISSION (HCC): ICD-10-CM

## 2023-08-07 RX ORDER — BUPROPION HYDROCHLORIDE 300 MG/1
300 TABLET ORAL DAILY
Qty: 30 TABLET | Refills: 2 | OUTPATIENT
Start: 2023-08-07

## 2023-08-07 RX ORDER — DULOXETIN HYDROCHLORIDE 20 MG/1
CAPSULE, DELAYED RELEASE ORAL
Qty: 30 CAPSULE | Refills: 2 | OUTPATIENT
Start: 2023-08-07

## 2023-08-07 NOTE — TELEPHONE ENCOUNTER
Request for refill was declined as the patient is no longer under prescriber's care (discharged on 4/21/23)

## 2023-08-13 DIAGNOSIS — F41.1 GENERALIZED ANXIETY DISORDER: ICD-10-CM

## 2023-08-13 DIAGNOSIS — F33.41 MDD (MAJOR DEPRESSIVE DISORDER), RECURRENT, IN PARTIAL REMISSION (HCC): ICD-10-CM

## 2023-08-20 RX ORDER — DULOXETIN HYDROCHLORIDE 60 MG/1
CAPSULE, DELAYED RELEASE ORAL
Qty: 90 CAPSULE | Refills: 0 | OUTPATIENT
Start: 2023-08-20

## 2023-08-22 DIAGNOSIS — F41.1 GENERALIZED ANXIETY DISORDER: ICD-10-CM

## 2023-08-22 DIAGNOSIS — F33.41 MDD (MAJOR DEPRESSIVE DISORDER), RECURRENT, IN PARTIAL REMISSION (HCC): ICD-10-CM

## 2023-08-22 RX ORDER — DULOXETIN HYDROCHLORIDE 20 MG/1
CAPSULE, DELAYED RELEASE ORAL
Qty: 30 CAPSULE | Refills: 2 | OUTPATIENT
Start: 2023-08-22

## 2023-08-22 RX ORDER — BUPROPION HYDROCHLORIDE 300 MG/1
300 TABLET ORAL DAILY
Qty: 30 TABLET | Refills: 2 | OUTPATIENT
Start: 2023-08-22

## 2023-08-23 DIAGNOSIS — F41.1 GENERALIZED ANXIETY DISORDER: ICD-10-CM

## 2023-08-23 DIAGNOSIS — F33.41 MDD (MAJOR DEPRESSIVE DISORDER), RECURRENT, IN PARTIAL REMISSION (HCC): ICD-10-CM

## 2023-08-23 RX ORDER — DULOXETIN HYDROCHLORIDE 60 MG/1
CAPSULE, DELAYED RELEASE ORAL
Qty: 90 CAPSULE | Refills: 0 | OUTPATIENT
Start: 2023-08-23

## 2023-10-07 NOTE — PSYCH
This note was not shared with the patient due to this is a psychotherapy note  Subjective:     Patient ID: Jacey Goldman is a 54 y o  male  [facility] Clinical Progress Notes      Specialized Services Documentation  Therapist must complete separate progress note for each specific clinical activity in which the individual participated during the day  Group Psychotherapy Life Skills Group (10:25-11:10) Memo Bey actively engaged in group focused on strengths mapping which was facilitated virtually in a private office using HIPAA Compliant and Approved Microsoft Teams  Memo Bey consented to the use of tele-video modality of treatment and was virtually present for group psychotherapy today  Clients were instructed to think of a positive experience that they had because they made it positive  Memo Bey stated that a positive experience that he had was 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 and may have financial difficulties   Memo Bey identified the things that he did to make it successful  Memose Bey identified using the strengths of being knowledgeable, considerate, hopeful, open, and responsible to help accomplish the positive experience  The group discussed why knowing their strengths was important  Memo Bey continues to make progress towards goals through verbal participation in group; to accomplish long term goals continue to utilize skills learned in programming  Continue with psychotherapy to educate and encourage use of wellness tools    Tx Plan Objective: 1 1,1 2 Therapist:  TMI Rider

## 2024-04-29 ENCOUNTER — APPOINTMENT (OUTPATIENT)
Dept: URGENT CARE | Facility: CLINIC | Age: 59
End: 2024-04-29
Payer: COMMERCIAL

## 2024-04-29 PROCEDURE — 90471 IMMUNIZATION ADMIN: CPT

## 2024-04-29 PROCEDURE — 90746 HEPB VACCINE 3 DOSE ADULT IM: CPT

## 2025-07-28 DIAGNOSIS — E11.9 TYPE 2 DIABETES MELLITUS WITHOUT COMPLICATION, WITHOUT LONG-TERM CURRENT USE OF INSULIN (HCC): Primary | ICD-10-CM

## 2025-07-28 RX ORDER — INSULIN LISPRO 100 [IU]/ML
INJECTION, SOLUTION INTRAVENOUS; SUBCUTANEOUS
Qty: 60 ML | Refills: 5 | Status: SHIPPED | OUTPATIENT
Start: 2025-07-28